# Patient Record
Sex: FEMALE | Race: WHITE | NOT HISPANIC OR LATINO | Employment: OTHER | URBAN - METROPOLITAN AREA
[De-identification: names, ages, dates, MRNs, and addresses within clinical notes are randomized per-mention and may not be internally consistent; named-entity substitution may affect disease eponyms.]

---

## 2017-01-04 ENCOUNTER — TREATMENT (OUTPATIENT)
Dept: PHYSICAL THERAPY | Facility: CLINIC | Age: 72
End: 2017-01-04

## 2017-01-04 DIAGNOSIS — M21.372 FOOT DROP, LEFT: Primary | ICD-10-CM

## 2017-01-04 DIAGNOSIS — R29.898 WEAKNESS OF LEFT LEG: ICD-10-CM

## 2017-01-04 PROCEDURE — 97116 GAIT TRAINING THERAPY: CPT | Performed by: PHYSICAL THERAPIST

## 2017-01-04 PROCEDURE — 97140 MANUAL THERAPY 1/> REGIONS: CPT | Performed by: PHYSICAL THERAPIST

## 2017-01-04 NOTE — PROGRESS NOTES
Daily Progress Note      Subjective   Pt reports compliance with HEP.        Objective   See the Objective Evaluation flowsheet for any objective testing that may have been performed today.    PROCEDURES AND MODALITIES:  Paraffin:    Moist Heat:    Ice:    E-Stim:    Ultrasound:    Ionto:   Traction:      Manual PT 55154 8 minutes, Therapy Exercise 78452 10 minutes and Gait Training 18347 12 minutes     Timed Code Treatment: 30 Minutes  Total Treatment Time: 50 Minutes    Assessment/Plan   Pt continues to have difficulty with active left hip flexion and ankle DF.  Pt fatigues easily with gait training.  Pt would benefit from additional skilled PT to continue to progress left LE strength to pt tolerance and improve gait.   Progress per Plan of Care       Noelle Reza, PT  Physical Therapist

## 2017-01-06 ENCOUNTER — TREATMENT (OUTPATIENT)
Dept: PHYSICAL THERAPY | Facility: CLINIC | Age: 72
End: 2017-01-06

## 2017-01-06 DIAGNOSIS — M21.372 FOOT DROP, LEFT: Primary | ICD-10-CM

## 2017-01-06 DIAGNOSIS — R29.898 WEAKNESS OF LEFT LEG: ICD-10-CM

## 2017-01-06 PROCEDURE — 97116 GAIT TRAINING THERAPY: CPT | Performed by: PHYSICAL THERAPIST

## 2017-01-06 PROCEDURE — 97110 THERAPEUTIC EXERCISES: CPT | Performed by: PHYSICAL THERAPIST

## 2017-01-06 NOTE — PROGRESS NOTES
Daily Progress Note      Subjective   Pt reports fatigue after last visit for the rest of the day.  Reports she is feeling back to normal today.       Objective   See the Objective Evaluation flowsheet for any objective testing that may have been performed today.    PROCEDURES AND MODALITIES:  Paraffin:    Moist Heat:    Ice:    E-Stim:    Ultrasound:    Ionto:   Traction:      Therapy Exercise 52056 15 minutes and Gait Training 96240 10 minutes     Timed Code Treatment: 25 Minutes  Total Treatment Time: 40 Minutes    Assessment/Plan   Pt required intermittent cueing for gait pattern.  Pt continues to fatigue quickly with hip flexion exercises and gait.  Pt would benefit from additional skilled PT to continue to progress LE strength and gait.   Progress strengthening /stabilization /functional activity         Noelle Reza, PT  Physical Therapist

## 2017-01-10 ENCOUNTER — TREATMENT (OUTPATIENT)
Dept: PHYSICAL THERAPY | Facility: CLINIC | Age: 72
End: 2017-01-10

## 2017-01-10 DIAGNOSIS — R29.898 WEAKNESS OF LEFT LEG: ICD-10-CM

## 2017-01-10 DIAGNOSIS — M21.372 FOOT DROP, LEFT: Primary | ICD-10-CM

## 2017-01-10 PROCEDURE — 97116 GAIT TRAINING THERAPY: CPT | Performed by: PHYSICAL THERAPIST

## 2017-01-11 NOTE — PROGRESS NOTES
Daily Progress Note      Subjective   Pt has no new complaints. Reports frustration that she cant get out of the house on her own anymore.   Pain Scale (0-10): 0      Objective   See the Objective Evaluation flowsheet for any objective testing that may have been performed today.    PROCEDURES AND MODALITIES:  Paraffin:    Moist Heat:    Ice:    E-Stim:    Ultrasound:    Ionto:   Traction:      Gait Training 40756 15 minutes     Timed Code Treatment: 15 Minutes  Total Treatment Time: 45 Minutes    Assessment/Plan   Pt performed well with all strengthening exercises today, does continue to demonstrate decreased left hip and knee flexion, ankle DF with gait.  Pt would benefit from additional skilled PT to continue to progress LE strength and improve gait.   Progress strengthening /stabilization /functional activity       Noelle Reza, PT  Physical Therapist

## 2017-01-17 ENCOUNTER — TREATMENT (OUTPATIENT)
Dept: PHYSICAL THERAPY | Facility: CLINIC | Age: 72
End: 2017-01-17

## 2017-01-17 DIAGNOSIS — R29.898 WEAKNESS OF LEFT LEG: ICD-10-CM

## 2017-01-17 DIAGNOSIS — M21.372 FOOT DROP, LEFT: Primary | ICD-10-CM

## 2017-01-17 PROCEDURE — 97116 GAIT TRAINING THERAPY: CPT | Performed by: PHYSICAL THERAPIST

## 2017-01-17 NOTE — PROGRESS NOTES
"Daily Progress Note      Subjective   Pt reports, \"I think I am going to make today my last visit.\"  Pain Scale (0-10): 0      Objective   See the Objective Evaluation flowsheet for any objective testing that may have been performed today.    PROCEDURES AND MODALITIES:  Paraffin:    Moist Heat:    Ice:    E-Stim:    Ultrasound:    Ionto:   Traction:      Gait Training 62075 15 minutes     Timed Code Treatment: 15 Minutes  Total Treatment Time: 40 Minutes    Assessment/Plan    Pt demonstrates independence with HEP, reports she can perform all exercises at home.  Pt does demonstrate improvement in left LE strengthening tolerance.  Pt continues to demonstrates decreased left hip and knee flexion during swing with gait, however, suspect this is due more to CNS involvement.  Pt met 3/4 STGs, 2/4 LTGs.   Other-d/c to [facility]       Noelle Reza, PT  Physical Therapist      "

## 2017-01-17 NOTE — PROGRESS NOTES
Discharge Report    Patient Name: Cesilia Goel       Patient MRN: [id number]  : 1945  Physician:Suraj Waters MD  Date: 2017    Encounter Diagnoses   Name Primary?   • Foot drop, left Yes   • Weakness of left leg      Treatment has included therapeutic exercise, manual therapy and gait training      Assessment: Pt demonstrates independence with HEP, reports she can perform all exercises at home.  Pt does demonstrate improvement in left LE strengthening tolerance.  Pt continues to demonstrates decreased left hip and knee flexion during swing with gait, however, suspect this is due more to CNS involvement.  Pt met 3/4 STGs, 2/4 LTGs.    Progress towards goals: Partially Met    Discharge Reason: Patient achieved goals, Plateau in patient's progress and Anticipate patient will achieve long-term goals independently      Plan of Care: Continue with current home exercise program as instructed    Prognosis: good    Understanding at Discharge: good

## 2017-02-08 RX ORDER — LOSARTAN POTASSIUM 100 MG/1
100 TABLET ORAL DAILY
Qty: 90 TABLET | Refills: 1 | Status: SHIPPED | OUTPATIENT
Start: 2017-02-08 | End: 2017-08-06 | Stop reason: SDUPTHER

## 2017-02-08 RX ORDER — ESCITALOPRAM OXALATE 10 MG/1
10 TABLET ORAL DAILY
Qty: 90 TABLET | Refills: 1 | Status: SHIPPED | OUTPATIENT
Start: 2017-02-08 | End: 2017-08-06 | Stop reason: SDUPTHER

## 2017-02-08 RX ORDER — ATORVASTATIN CALCIUM 40 MG/1
TABLET, FILM COATED ORAL
Qty: 90 TABLET | Refills: 0 | Status: SHIPPED | OUTPATIENT
Start: 2017-02-08 | End: 2017-05-11 | Stop reason: SDUPTHER

## 2017-02-20 PROBLEM — E11.9 DIABETES: Status: ACTIVE | Noted: 2017-02-20

## 2017-03-07 DIAGNOSIS — E11.9 TYPE 2 DIABETES MELLITUS WITHOUT COMPLICATION, WITHOUT LONG-TERM CURRENT USE OF INSULIN (HCC): ICD-10-CM

## 2017-03-07 DIAGNOSIS — Z00.00 ANNUAL PHYSICAL EXAM: Primary | ICD-10-CM

## 2017-03-11 LAB
ALBUMIN SERPL-MCNC: 4.4 G/DL (ref 3.5–5.2)
ALBUMIN/GLOB SERPL: 1.7 G/DL
ALP SERPL-CCNC: 94 U/L (ref 39–117)
ALT SERPL-CCNC: 24 U/L (ref 1–33)
APPEARANCE UR: ABNORMAL
AST SERPL-CCNC: 19 U/L (ref 1–32)
BACTERIA #/AREA URNS HPF: ABNORMAL /HPF
BACTERIA UR CULT: NORMAL
BACTERIA UR CULT: NORMAL
BASOPHILS # BLD AUTO: 0.03 10*3/MM3 (ref 0–0.2)
BASOPHILS NFR BLD AUTO: 0.3 % (ref 0–1.5)
BILIRUB SERPL-MCNC: 0.3 MG/DL (ref 0.1–1.2)
BILIRUB UR QL STRIP: NEGATIVE
BUN SERPL-MCNC: 15 MG/DL (ref 8–23)
BUN/CREAT SERPL: 25.4 (ref 7–25)
CALCIUM SERPL-MCNC: 10.3 MG/DL (ref 8.6–10.5)
CHLORIDE SERPL-SCNC: 99 MMOL/L (ref 98–107)
CHOLEST SERPL-MCNC: 180 MG/DL (ref 0–200)
CO2 SERPL-SCNC: 28.1 MMOL/L (ref 22–29)
COLOR UR: YELLOW
CREAT SERPL-MCNC: 0.59 MG/DL (ref 0.57–1)
EOSINOPHIL # BLD AUTO: 0.38 10*3/MM3 (ref 0–0.7)
EOSINOPHIL NFR BLD AUTO: 3.4 % (ref 0.3–6.2)
EPI CELLS #/AREA URNS HPF: >10 /HPF
ERYTHROCYTE [DISTWIDTH] IN BLOOD BY AUTOMATED COUNT: 14.6 % (ref 11.7–13)
GLOBULIN SER CALC-MCNC: 2.6 GM/DL
GLUCOSE SERPL-MCNC: 147 MG/DL (ref 65–99)
GLUCOSE UR QL: NEGATIVE
HBA1C MFR BLD: 7.2 % (ref 4.8–5.6)
HCT VFR BLD AUTO: 43.2 % (ref 35.6–45.5)
HDLC SERPL-MCNC: 46 MG/DL (ref 40–60)
HGB BLD-MCNC: 13.8 G/DL (ref 11.9–15.5)
HGB UR QL STRIP: NEGATIVE
IMM GRANULOCYTES # BLD: 0.03 10*3/MM3 (ref 0–0.03)
IMM GRANULOCYTES NFR BLD: 0.3 % (ref 0–0.5)
KETONES UR QL STRIP: NEGATIVE
LDLC SERPL CALC-MCNC: 64 MG/DL (ref 0–100)
LEUKOCYTE ESTERASE UR QL STRIP: ABNORMAL
LYMPHOCYTES # BLD AUTO: 3.77 10*3/MM3 (ref 0.9–4.8)
LYMPHOCYTES NFR BLD AUTO: 33.8 % (ref 19.6–45.3)
MCH RBC QN AUTO: 29.4 PG (ref 26.9–32)
MCHC RBC AUTO-ENTMCNC: 31.9 G/DL (ref 32.4–36.3)
MCV RBC AUTO: 91.9 FL (ref 80.5–98.2)
MICRO URNS: ABNORMAL
MICROALBUMIN UR-MCNC: 5.6 UG/ML
MONOCYTES # BLD AUTO: 0.79 10*3/MM3 (ref 0.2–1.2)
MONOCYTES NFR BLD AUTO: 7.1 % (ref 5–12)
MUCOUS THREADS URNS QL MICRO: PRESENT /HPF
NEUTROPHILS # BLD AUTO: 6.14 10*3/MM3 (ref 1.9–8.1)
NEUTROPHILS NFR BLD AUTO: 55.1 % (ref 42.7–76)
NITRITE UR QL STRIP: NEGATIVE
PH UR STRIP: 6 [PH] (ref 5–7.5)
PLATELET # BLD AUTO: 257 10*3/MM3 (ref 140–500)
POTASSIUM SERPL-SCNC: 4.5 MMOL/L (ref 3.5–5.2)
PROT SERPL-MCNC: 7 G/DL (ref 6–8.5)
PROT UR QL STRIP: ABNORMAL
RBC # BLD AUTO: 4.7 10*6/MM3 (ref 3.9–5.2)
RBC #/AREA URNS HPF: ABNORMAL /HPF
SODIUM SERPL-SCNC: 141 MMOL/L (ref 136–145)
SP GR UR: 1.02 (ref 1–1.03)
TRIGL SERPL-MCNC: 349 MG/DL (ref 0–150)
TSH SERPL DL<=0.005 MIU/L-ACNC: 2.26 UIU/ML (ref 0.45–4.5)
URINALYSIS REFLEX: ABNORMAL
UROBILINOGEN UR STRIP-MCNC: 0.2 MG/DL (ref 0.2–1)
VLDLC SERPL CALC-MCNC: 69.8 MG/DL (ref 5–40)
WBC # BLD AUTO: 11.14 10*3/MM3 (ref 4.5–10.7)
WBC #/AREA URNS HPF: ABNORMAL /HPF

## 2017-03-15 ENCOUNTER — TELEPHONE (OUTPATIENT)
Dept: SPORTS MEDICINE | Facility: CLINIC | Age: 72
End: 2017-03-15

## 2017-03-15 NOTE — TELEPHONE ENCOUNTER
----- Message from Suraj Waters MD sent at 3/13/2017 11:36 PM EDT -----  Labs are all stable. Diabetes is better than before but still not quite ideal. Let's continue the same meds and follow up in 6 months.

## 2017-05-11 RX ORDER — ATORVASTATIN CALCIUM 40 MG/1
TABLET, FILM COATED ORAL
Qty: 90 TABLET | Refills: 0 | Status: SHIPPED | OUTPATIENT
Start: 2017-05-11 | End: 2017-08-06 | Stop reason: SDUPTHER

## 2017-06-26 ENCOUNTER — APPOINTMENT (OUTPATIENT)
Dept: WOMENS IMAGING | Facility: HOSPITAL | Age: 72
End: 2017-06-26

## 2017-06-26 ENCOUNTER — PROCEDURE VISIT (OUTPATIENT)
Dept: OBSTETRICS AND GYNECOLOGY | Facility: CLINIC | Age: 72
End: 2017-06-26

## 2017-06-26 DIAGNOSIS — Z12.31 VISIT FOR SCREENING MAMMOGRAM: Primary | ICD-10-CM

## 2017-06-26 PROCEDURE — G0202 SCR MAMMO BI INCL CAD: HCPCS | Performed by: RADIOLOGY

## 2017-06-26 PROCEDURE — G0202 SCR MAMMO BI INCL CAD: HCPCS | Performed by: OBSTETRICS & GYNECOLOGY

## 2017-07-20 ENCOUNTER — OFFICE VISIT (OUTPATIENT)
Dept: SPORTS MEDICINE | Facility: CLINIC | Age: 72
End: 2017-07-20

## 2017-07-20 VITALS
WEIGHT: 165 LBS | DIASTOLIC BLOOD PRESSURE: 72 MMHG | SYSTOLIC BLOOD PRESSURE: 122 MMHG | HEIGHT: 63 IN | OXYGEN SATURATION: 96 % | BODY MASS INDEX: 29.23 KG/M2 | HEART RATE: 72 BPM

## 2017-07-20 DIAGNOSIS — Z11.59 NEED FOR HEPATITIS C SCREENING TEST: ICD-10-CM

## 2017-07-20 DIAGNOSIS — E78.2 MIXED HYPERLIPIDEMIA: ICD-10-CM

## 2017-07-20 DIAGNOSIS — Z00.00 MEDICARE ANNUAL WELLNESS VISIT, SUBSEQUENT: ICD-10-CM

## 2017-07-20 DIAGNOSIS — I10 ESSENTIAL HYPERTENSION: ICD-10-CM

## 2017-07-20 DIAGNOSIS — J40 BRONCHITIS: Primary | ICD-10-CM

## 2017-07-20 DIAGNOSIS — E11.9 TYPE 2 DIABETES MELLITUS WITHOUT COMPLICATION, WITHOUT LONG-TERM CURRENT USE OF INSULIN (HCC): ICD-10-CM

## 2017-07-20 PROBLEM — F32.0 MILD SINGLE CURRENT EPISODE OF MAJOR DEPRESSIVE DISORDER: Status: ACTIVE | Noted: 2017-07-20

## 2017-07-20 PROBLEM — R42 DIZZINESS: Status: RESOLVED | Noted: 2017-07-20 | Resolved: 2017-07-20

## 2017-07-20 PROBLEM — IMO0001 BLUES: Status: ACTIVE | Noted: 2017-07-20

## 2017-07-20 PROBLEM — R42 DIZZINESS: Status: ACTIVE | Noted: 2017-07-20

## 2017-07-20 PROBLEM — R32 ABSENCE OF BLADDER CONTINENCE: Status: ACTIVE | Noted: 2017-07-20

## 2017-07-20 PROBLEM — J30.9 ALLERGIC RHINITIS: Status: ACTIVE | Noted: 2017-07-20

## 2017-07-20 PROBLEM — L30.9 DERMATITIS, ECZEMATOID: Status: ACTIVE | Noted: 2017-07-20

## 2017-07-20 PROBLEM — E78.5 HLD (HYPERLIPIDEMIA): Status: ACTIVE | Noted: 2017-07-20

## 2017-07-20 PROCEDURE — G0439 PPPS, SUBSEQ VISIT: HCPCS | Performed by: FAMILY MEDICINE

## 2017-07-20 PROCEDURE — 99214 OFFICE O/P EST MOD 30 MIN: CPT | Performed by: FAMILY MEDICINE

## 2017-07-20 RX ORDER — MULTIVITAMIN
1 TABLET ORAL
COMMUNITY

## 2017-07-20 RX ORDER — AZITHROMYCIN 250 MG/1
TABLET, FILM COATED ORAL
Qty: 6 TABLET | Refills: 0 | Status: SHIPPED | OUTPATIENT
Start: 2017-07-20 | End: 2017-09-08

## 2017-07-20 RX ORDER — VALSARTAN 160 MG/1
160 TABLET ORAL
COMMUNITY
End: 2019-02-04

## 2017-07-20 RX ORDER — SOLIFENACIN SUCCINATE 5 MG/1
TABLET, FILM COATED ORAL
Refills: 2 | COMMUNITY
Start: 2017-04-13 | End: 2017-07-20 | Stop reason: SDUPTHER

## 2017-07-20 RX ORDER — SOLIFENACIN SUCCINATE 5 MG/1
5 TABLET, FILM COATED ORAL DAILY
Qty: 90 TABLET | Refills: 3 | Status: SHIPPED | OUTPATIENT
Start: 2017-07-20 | End: 2019-02-04

## 2017-07-20 NOTE — PROGRESS NOTES
"Cesilia is a 71 y.o. year old female    In addition to AWV:  Chief Complaint   Patient presents with   • Sinusitis     Pt would like to discuss       History of Present Illness   HPI   Recent sinus congestion  Improved today but was severe yesterday - pressure in face/ears  Cough, no SOA, feels some tightness in evenings    HTN, HLD, DM - stable with current meds, no symptoms    MS - Having infusion in August for her MS ([facility])    Incontinence - vesicare working well    I have reviewed the patient's medical history in detail and updated the computerized patient record.    Review of Systems   Constitutional: Negative for fever.   HENT: Positive for sinus pressure.    Respiratory: Negative for shortness of breath.    Cardiovascular: Negative for chest pain.       /72  Pulse 72  Ht 63\" (160 cm)  Wt 165 lb (74.8 kg)  SpO2 96%  BMI 29.23 kg/m2     Physical Exam   Constitutional: She is oriented to person, place, and time. She appears well-developed and well-nourished.   Neck: Normal range of motion.   Cardiovascular: Normal rate, regular rhythm and normal heart sounds.    Pulmonary/Chest: Effort normal and breath sounds normal.   Neurological: She is alert and oriented to person, place, and time.   Skin: Skin is warm and dry.   Psychiatric: She has a normal mood and affect.   Vitals reviewed.       Diagnoses and all orders for this visit:    Bronchitis    Type 2 diabetes mellitus without complication, without long-term current use of insulin  -     Hemoglobin A1c    Essential hypertension    Mixed hyperlipidemia    Need for hepatitis C screening test  -     Hepatitis C Antibody    Other orders  -     Multiple Vitamin (MULTIVITAMIN) tablet; Take 1 tablet by mouth.  -     Discontinue: VESICARE 5 MG tablet; TK 1 T PO QD  -     valsartan (DIOVAN) 160 MG tablet; Take 160 mg by mouth.  -     Calcium Carbonate-Vitamin D 500-125 MG-UNIT tablet; Take  by mouth.  -     VESICARE 5 MG tablet; Take 1 tablet by mouth " Daily.  -     azithromycin (ZITHROMAX Z-DEBRA) 250 MG tablet; Take 2 tablets the first day, then 1 tablet daily for 4 days.     Appears to have lingering bronchitis, use zpak plus dulera bid x 2 weeks (sample given)

## 2017-07-20 NOTE — PROGRESS NOTES
QUICK REFERENCE INFORMATION:  The ABCs of the Annual Wellness Visit    Subsequent Medicare Wellness Visit    HEALTH RISK ASSESSMENT    1945    Recent Hospitalizations:  No hospitalization(s) within the last year..        Current Medical Providers:  Patient Care Team:  Suraj Waters MD as PCP - General (Sports Medicine)  Montrell Hoang DPM as PCP - Claims Attributed        Smoking Status:  History   Smoking Status   • Never Smoker   Smokeless Tobacco   • Not on file       Alcohol Consumption:  History   Alcohol Use No       Depression Screen:   PHQ-9 Depression Screening 7/20/2017   Little interest or pleasure in doing things 0   Feeling down, depressed, or hopeless 0   Trouble falling or staying asleep, or sleeping too much 0   Feeling tired or having little energy 0   Poor appetite or overeating 0   Feeling bad about yourself - or that you are a failure or have let yourself or your family down 0   Trouble concentrating on things, such as reading the newspaper or watching television 0   Moving or speaking so slowly that other people could have noticed. Or the opposite - being so fidgety or restless that you have been moving around a lot more than usual 0   Thoughts that you would be better off dead, or of hurting yourself in some way 0   PHQ-9 Total Score 0   If you checked off any problems, how difficult have these problems made it for you to do your work, take care of things at home, or get along with other people? Not difficult at all       Health Habits and Functional and Cognitive Screening:  No flowsheet data found.           Does the patient have evidence of cognitive impairment? No    Aspirin use counseling: Start ASA 81 mg daily       Recent Lab Results:  CMP:  Lab Results   Component Value Date     (H) 03/08/2017    BUN 15 03/08/2017    CREATININE 0.59 03/08/2017    EGFRIFNONA 101 03/08/2017    EGFRIFAFRI 122 03/08/2017    BCR 25.4 (H) 03/08/2017     03/08/2017    K 4.5  03/08/2017    CO2 28.1 03/08/2017    CALCIUM 10.3 03/08/2017    PROTENTOTREF 7.0 03/08/2017    ALBUMIN 4.40 03/08/2017    LABGLOBREF 2.6 03/08/2017    LABIL2 1.7 03/08/2017    BILITOT 0.3 03/08/2017    ALKPHOS 94 03/08/2017    AST 19 03/08/2017    ALT 24 03/08/2017     Lipid Panel:  Lab Results   Component Value Date    TRIG 349 (H) 03/08/2017    HDL 46 03/08/2017    VLDL 69.8 (H) 03/08/2017     HbA1c:  Lab Results   Component Value Date    HGBA1C 7.30 (H) 07/20/2017       Visual Acuity:  No exam data present    Age-appropriate Screening Schedule:  Refer to the list below for future screening recommendations based on patient's age, sex and/or medical conditions. Orders for these recommended tests are listed in the plan section. The patient has been provided with a written plan.    Health Maintenance   Topic Date Due   • DIABETIC FOOT EXAM  03/29/2016   • INFLUENZA VACCINE  08/01/2017   • HEMOGLOBIN A1C  01/20/2018   • LIPID PANEL  03/08/2018   • URINE MICROALBUMIN  03/08/2018   • DIABETIC EYE EXAM  06/08/2018   • PNEUMOCOCCAL VACCINES (65+ LOW/MEDIUM RISK) (2 of 2 - PPSV23) 07/20/2018   • MAMMOGRAM  06/26/2019   • COLONOSCOPY  07/20/2027   • TDAP/TD VACCINES (2 - Td) 07/20/2027   • ZOSTER VACCINE  Addressed        Subjective   History of Present Illness    Cesilia Goel is a 71 y.o. female who presents for an Subsequent Wellness Visit.    The following portions of the patient's history were reviewed and updated as appropriate: allergies, current medications, past family history, past medical history, past social history, past surgical history and problem list.    Outpatient Medications Prior to Visit   Medication Sig Dispense Refill   • atorvastatin (LIPITOR) 40 MG tablet TAKE 1 TABLET BY MOUTH DAILY 90 tablet 0   • escitalopram (LEXAPRO) 10 MG tablet Take 1 tablet by mouth Daily. 90 tablet 1   • losartan (COZAAR) 100 MG tablet Take 1 tablet by mouth Daily. 90 tablet 1   • metFORMIN (GLUCOPHAGE) 1000 MG tablet  TAKE 1 TABLET BY MOUTH TWICE DAILY AFTER MEAL 180 tablet 0     No facility-administered medications prior to visit.        Patient Active Problem List   Diagnosis   • Type 2 diabetes mellitus without complication, without long-term current use of insulin   • Allergic rhinitis   • Mild single current episode of major depressive disorder   • Dermatitis, eczematoid   • HLD (hyperlipidemia)   • Hypertension   • Multiple sclerosis, primary progressive   • Absence of bladder continence       Advance Care Planning:  has an advance directive - a copy HAS NOT been provided. Have asked the patient to send this to us to add to record.    Identification of Risk Factors:  Risk factors include: weight , cardiovascular risk, inactivity and incontinence.    Review of Systems   Constitutional: Negative for fatigue.   Respiratory: Negative for shortness of breath.    Cardiovascular: Negative for chest pain.       Compared to one year ago, the patient feels her physical health is the same.  Compared to one year ago, the patient feels her mental health is the same.    Objective     Physical Exam   Constitutional: She is oriented to person, place, and time. She appears well-developed and well-nourished.   HENT:   Mouth/Throat: Oropharynx is clear and moist.   Eyes: Conjunctivae are normal. Pupils are equal, round, and reactive to light.   Neck: Normal range of motion. Neck supple. No thyromegaly present.   Cardiovascular: Normal rate, regular rhythm and normal heart sounds.    Pulmonary/Chest: Effort normal and breath sounds normal.   Abdominal: Soft.   Musculoskeletal: She exhibits no deformity.   Gait is stable; slow and cautious   Neurological: She is alert and oriented to person, place, and time.   Skin: Skin is warm and dry.   Psychiatric: She has a normal mood and affect. Her behavior is normal.   Memory seems to have slower recall   Vitals reviewed.      Vitals:    07/20/17 1341   BP: 122/72   Pulse: 72   SpO2: 96%   Weight: 165  "lb (74.8 kg)   Height: 63\" (160 cm)   PainSc: 0-No pain       Body mass index is 29.23 kg/(m^2).  Discussed the patient's BMI with her. The BMI is above average; BMI management plan is completed.    Assessment/Plan   Patient Self-Management and Personalized Health Advice  The patient has been provided with information about: diet, exercise, weight management, prevention of cardiac or vascular disease, fall prevention, designing advance directives and mental health concerns and preventive services including:   · Advance directive, Counseling for cardiovascular disease risk reduction, Nutrition counseling provided.    Visit Diagnoses:    ICD-10-CM ICD-9-CM   1. Bronchitis J40 490   2. Type 2 diabetes mellitus without complication, without long-term current use of insulin E11.9 250.00   3. Essential hypertension I10 401.9   4. Mixed hyperlipidemia E78.2 272.2   5. Need for hepatitis C screening test Z11.59 V73.89       Orders Placed This Encounter   Procedures   • Hepatitis C Antibody     Order Specific Question:   [facility] Has the patient fasted?     Answer:   No   • Hemoglobin A1c     Order Specific Question:   [facility] Has the patient fasted?     Answer:   No       Outpatient Encounter Prescriptions as of 7/20/2017   Medication Sig Dispense Refill   • atorvastatin (LIPITOR) 40 MG tablet TAKE 1 TABLET BY MOUTH DAILY 90 tablet 0   • azithromycin (ZITHROMAX Z-DEBRA) 250 MG tablet Take 2 tablets the first day, then 1 tablet daily for 4 days. 6 tablet 0   • Calcium Carbonate-Vitamin D 500-125 MG-UNIT tablet Take  by mouth.     • escitalopram (LEXAPRO) 10 MG tablet Take 1 tablet by mouth Daily. 90 tablet 1   • losartan (COZAAR) 100 MG tablet Take 1 tablet by mouth Daily. 90 tablet 1   • metFORMIN (GLUCOPHAGE) 1000 MG tablet TAKE 1 TABLET BY MOUTH TWICE DAILY AFTER MEAL 180 tablet 0   • Multiple Vitamin (MULTIVITAMIN) tablet Take 1 tablet by mouth.     • valsartan (DIOVAN) 160 MG tablet Take 160 mg by mouth.     • VESICARE 5 " MG tablet Take 1 tablet by mouth Daily. 90 tablet 3   • [DISCONTINUED] VESICARE 5 MG tablet TK 1 T PO QD  2     No facility-administered encounter medications on file as of 7/20/2017.        Reviewed use of high risk medication in the elderly: yes  Reviewed for potential of harmful drug interactions in the elderly: yes    Follow Up:  Return in about 6 months (around 1/20/2018).     An After Visit Summary and PPPS with all of these plans were given to the patient.

## 2017-07-21 ENCOUNTER — TELEPHONE (OUTPATIENT)
Dept: SPORTS MEDICINE | Facility: CLINIC | Age: 72
End: 2017-07-21

## 2017-07-21 LAB
HBA1C MFR BLD: 7.3 % (ref 4.8–5.6)
HCV AB S/CO SERPL IA: <0.1 S/CO RATIO (ref 0–0.9)

## 2017-07-21 NOTE — TELEPHONE ENCOUNTER
----- Message from Suraj Waters MD sent at 7/21/2017  1:30 PM EDT -----  A1c is slightly higher than last check, up to 7.3 from 7.2; this is not severe but does warrant continued caution for preventing complications. Request continue to be careful with sugar/starches in diet. We can continue to be careful in this range for now and recheck in 6 months.

## 2017-08-07 RX ORDER — ATORVASTATIN CALCIUM 40 MG/1
TABLET, FILM COATED ORAL
Qty: 90 TABLET | Refills: 0 | Status: SHIPPED | OUTPATIENT
Start: 2017-08-07 | End: 2017-11-05 | Stop reason: SDUPTHER

## 2017-08-07 RX ORDER — ESCITALOPRAM OXALATE 10 MG/1
TABLET ORAL
Qty: 90 TABLET | Refills: 0 | Status: SHIPPED | OUTPATIENT
Start: 2017-08-07 | End: 2017-11-05 | Stop reason: SDUPTHER

## 2017-08-07 RX ORDER — LOSARTAN POTASSIUM 100 MG/1
TABLET ORAL
Qty: 90 TABLET | Refills: 0 | Status: SHIPPED | OUTPATIENT
Start: 2017-08-07 | End: 2017-11-05 | Stop reason: SDUPTHER

## 2017-09-08 ENCOUNTER — OFFICE VISIT (OUTPATIENT)
Dept: SPORTS MEDICINE | Facility: CLINIC | Age: 72
End: 2017-09-08

## 2017-09-08 VITALS
DIASTOLIC BLOOD PRESSURE: 74 MMHG | HEART RATE: 82 BPM | BODY MASS INDEX: 28.88 KG/M2 | WEIGHT: 163 LBS | HEIGHT: 63 IN | OXYGEN SATURATION: 98 % | SYSTOLIC BLOOD PRESSURE: 118 MMHG | TEMPERATURE: 98.5 F

## 2017-09-08 DIAGNOSIS — R05.9 COUGH: Primary | ICD-10-CM

## 2017-09-08 DIAGNOSIS — J40 BRONCHITIS: ICD-10-CM

## 2017-09-08 PROCEDURE — 71020 XR CHEST PA AND LATERAL: CPT | Performed by: FAMILY MEDICINE

## 2017-09-08 PROCEDURE — 99214 OFFICE O/P EST MOD 30 MIN: CPT | Performed by: FAMILY MEDICINE

## 2017-09-08 RX ORDER — CEFDINIR 300 MG/1
300 CAPSULE ORAL 2 TIMES DAILY
Qty: 20 CAPSULE | Refills: 0 | Status: SHIPPED | OUTPATIENT
Start: 2017-09-08 | End: 2017-10-17

## 2017-09-08 NOTE — PROGRESS NOTES
"Cesilia is a 71 y.o. year old female    Chief Complaint   Patient presents with   • Cough     x2 days   • Fatigue   • Sore Throat       History of Present Illness   HPI   Cough for a few days, mod severe, assoc with fatigue and sore throat. Productive of mucus    I have reviewed the patient's medical, family, and social history in detail and updated the computerized patient record.    Review of Systems   Constitutional: Negative for fever.   Respiratory: Positive for cough. Negative for shortness of breath.    Gastrointestinal: Negative.        /74  Pulse 82  Temp 98.5 °F (36.9 °C)  Ht 63\" (160 cm)  Wt 163 lb (73.9 kg)  SpO2 98%  BMI 28.87 kg/m2     Physical Exam   Constitutional: She appears well-developed.   HENT:   Posterior pharynx erythema   Eyes: Pupils are equal, round, and reactive to light.   Pulmonary/Chest: Effort normal.   There are diffuse rhonchi in the mid and lower lung fields bilaterally.  Faint wheezes.  No rales.   Psychiatric: She has a normal mood and affect.   Vitals reviewed.    Chest X-Ray  Indication: Cough  Views: PA and Lateral    Findings:  General pulmonary congestion without focal infiltrate  No pleural effusion.  Heart size and shape are normal.  Mediastinum is normal.  No visible rib fractures.   Overall, normal chest.    There were no previous studies available for comparison.       Diagnoses and all orders for this visit:    Cough  -     XR Chest PA & Lateral    Bronchitis  -     cefdinir (OMNICEF) 300 MG capsule; Take 1 capsule by mouth 2 (Two) Times a Day.    If not improving consider pulmonary function tests or short-term empiric inhaler use  "

## 2017-09-14 RX ORDER — METHYLPREDNISOLONE 4 MG/1
TABLET ORAL
Qty: 21 TABLET | Refills: 0 | Status: SHIPPED | OUTPATIENT
Start: 2017-09-14 | End: 2017-10-17

## 2017-10-13 ENCOUNTER — TELEPHONE (OUTPATIENT)
Dept: SPORTS MEDICINE | Facility: CLINIC | Age: 72
End: 2017-10-13

## 2017-10-13 NOTE — TELEPHONE ENCOUNTER
called and says wife is still having symptoms of the bronchitis and was requesting an appt. Can we schedule appt for her to be seen?

## 2017-10-17 ENCOUNTER — OFFICE VISIT (OUTPATIENT)
Dept: SPORTS MEDICINE | Facility: CLINIC | Age: 72
End: 2017-10-17

## 2017-10-17 VITALS
SYSTOLIC BLOOD PRESSURE: 120 MMHG | OXYGEN SATURATION: 96 % | BODY MASS INDEX: 28.17 KG/M2 | HEIGHT: 63 IN | DIASTOLIC BLOOD PRESSURE: 76 MMHG | HEART RATE: 84 BPM | WEIGHT: 159 LBS

## 2017-10-17 DIAGNOSIS — R05.9 COUGH: Primary | ICD-10-CM

## 2017-10-17 PROCEDURE — 99213 OFFICE O/P EST LOW 20 MIN: CPT | Performed by: FAMILY MEDICINE

## 2017-10-17 NOTE — PROGRESS NOTES
"Cesilia is a 72 y.o. year old female    Chief Complaint   Patient presents with   • Cough     Pt is here to f/u       History of Present Illness   HPI   Here for ongoing cough, which has been present on and off since July. Notes it did get worse after starting her new MS drug infusion, poss SE?   notes she is also clearing her throat.  Cough is productive of a small amount of mucus, relatively constant throughout the day, no associated fever or chills or shortness of breath    I have reviewed the patient's medical, family, and social history in detail and updated the computerized patient record.    Review of Systems   Constitutional: Negative.    Respiratory: Positive for cough. Negative for shortness of breath.    Gastrointestinal: Negative.        /76  Pulse 84  Ht 63\" (160 cm)  Wt 159 lb (72.1 kg)  SpO2 96%  BMI 28.17 kg/m2     Physical Exam   Constitutional: She appears well-developed.   HENT:   Mild posterior pharynx erythema   Eyes: Pupils are equal, round, and reactive to light.   Neck: Normal range of motion.   Cardiovascular: Normal heart sounds.    Pulmonary/Chest: Effort normal. No respiratory distress. She has no wheezes.   Few scattered rhonchi, good air movement   Psychiatric: She has a normal mood and affect.   Vitals reviewed.       Diagnoses and all orders for this visit:    Cough    I think this may be allergy in nature.  She is going to add antihistamine at bedtime and fluticasone 2 sprays per nostril daily. They will update me on her progress in 2 weeks; if not improving consider allergy consultation or spirometry    I spent greater than 50% of this 15 minute visit discussing the diagnosis, prognosis, treatment plan, etc. Patient's questions were answered in detail with appropriate counseling and anticipatory guidance.     EMR Dragon/Transcription disclaimer:    Much of this encounter note is an electronic transcription/translation of spoken language to printed text.  The " electronic translation of spoken language may permit erroneous, or at times, nonsensical words or phrases to be inadvertently transcribed.  Although I have reviewed the note for such errors some may still exist.

## 2017-11-06 RX ORDER — LOSARTAN POTASSIUM 100 MG/1
TABLET ORAL
Qty: 90 TABLET | Refills: 0 | Status: SHIPPED | OUTPATIENT
Start: 2017-11-06 | End: 2018-02-02 | Stop reason: SDUPTHER

## 2017-11-06 RX ORDER — ATORVASTATIN CALCIUM 40 MG/1
TABLET, FILM COATED ORAL
Qty: 90 TABLET | Refills: 0 | Status: SHIPPED | OUTPATIENT
Start: 2017-11-06 | End: 2018-02-02 | Stop reason: SDUPTHER

## 2017-11-06 RX ORDER — ESCITALOPRAM OXALATE 10 MG/1
TABLET ORAL
Qty: 90 TABLET | Refills: 0 | Status: SHIPPED | OUTPATIENT
Start: 2017-11-06 | End: 2018-02-02 | Stop reason: SDUPTHER

## 2017-11-14 ENCOUNTER — OFFICE VISIT (OUTPATIENT)
Dept: SLEEP MEDICINE | Facility: HOSPITAL | Age: 72
End: 2017-11-14
Attending: PSYCHIATRY & NEUROLOGY

## 2017-11-14 VITALS
WEIGHT: 166 LBS | BODY MASS INDEX: 28.34 KG/M2 | HEIGHT: 64 IN | HEART RATE: 84 BPM | DIASTOLIC BLOOD PRESSURE: 63 MMHG | SYSTOLIC BLOOD PRESSURE: 137 MMHG

## 2017-11-14 DIAGNOSIS — G47.33 OSA (OBSTRUCTIVE SLEEP APNEA): Primary | ICD-10-CM

## 2017-11-14 DIAGNOSIS — G47.10 HYPERSOMNIA: ICD-10-CM

## 2017-11-14 PROCEDURE — G0463 HOSPITAL OUTPT CLINIC VISIT: HCPCS

## 2017-11-14 NOTE — PROGRESS NOTES
"Cesilia Goel  : 1945  [id number]     DATE OF VISIT: 2017     HISTORY OF PRESENT ILLNESS: The patient returns for followup visit. 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 She received IV infusion with OCUVEVUS in 2017 and she is under the care of Dr. Tez Mckay at [facility].    She has severe obstructive sleep apnea syndrome and her polysomnography in the past has revealed apnea-hypopnea index of 83.6 per sleep hour, minimum SpO2 of 79% and is on CPAP at 15 cm H20. She had hypersomnia with [place] Sleepiness Scale score of 9.     Compliance data indicates excellent compliance with CPAP at 15 cm H20 with 96.7% usage with an average usage of 8 hours and 47 minutes and AHI down to 1.8 and [place] Sleepiness Scale score is down to 6.     ROS: Significant for postnasal drip and cough.  PHYSICAL EXAMINATION:  Vitals:    17 1137   BP: 137/63   BP Location: Left arm   Patient Position: Sitting   Pulse: 84   Weight: 166 lb (75.3 kg)   Height: 64\" (162.6 cm)     HEENT: Normal.   NECK: Supple. No bruits.   CARDIAC: Normal.   LUNGS: Clear to auscultation.   EXTREMITIES: No edema. She has foot drop and spastic paraparesis.     IMPRESSION: Patient with severe obstructive sleep apnea syndrome, AHI 83.6, minimum SpO2 of 79% and on CPAP at 15 cm H20, and excellent compliance. She is compliant and benefiting from it.     RECOMMENDATIONS: Continue present CPAP. Follow up in 1 year.     Mathew Hewitt M.D.  2017     "

## 2018-01-02 ENCOUNTER — OFFICE VISIT (OUTPATIENT)
Dept: SPORTS MEDICINE | Facility: CLINIC | Age: 73
End: 2018-01-02

## 2018-01-02 VITALS
WEIGHT: 166 LBS | SYSTOLIC BLOOD PRESSURE: 124 MMHG | DIASTOLIC BLOOD PRESSURE: 74 MMHG | OXYGEN SATURATION: 98 % | HEART RATE: 82 BPM | BODY MASS INDEX: 28.34 KG/M2 | HEIGHT: 64 IN

## 2018-01-02 DIAGNOSIS — S01.01XA LACERATION OF SCALP, INITIAL ENCOUNTER: Primary | ICD-10-CM

## 2018-01-02 DIAGNOSIS — R32 URINARY INCONTINENCE, UNSPECIFIED TYPE: ICD-10-CM

## 2018-01-02 PROCEDURE — 99212 OFFICE O/P EST SF 10 MIN: CPT | Performed by: FAMILY MEDICINE

## 2018-01-02 NOTE — PROGRESS NOTES
"Cesilia is a 72 y.o. year old female    Chief Complaint   Patient presents with   • Head Injury     Pt is here for ER f/u        History of Present Illness   HPI   Here today to follow-up on laceration. On 12/27 she bent over and hit her head on a cabinet.  Had bleeding and was stapled in the emergency room.  Otherwise no complications with that.  Had some mild headache for 24 hours or less that resolved.    I have reviewed the patient's medical, family, and social history in detail and updated the computerized patient record.    Review of Systems   Constitutional: Negative.        /74  Pulse 82  Ht 162.6 cm (64\")  Wt 75.3 kg (166 lb)  SpO2 98%  BMI 28.49 kg/m2     Physical Exam  Well-healed laceration anterior aspect of the scalp.  Single staple was removed without difficulty.     Diagnoses and all orders for this visit:    Laceration of scalp, initial encounter    Urinary incontinence, unspecified type  -     Ambulatory Referral to Gynecologic Urology             EMR Dragon/Transcription disclaimer:    Much of this encounter note is an electronic transcription/translation of spoken language to printed text.  The electronic translation of spoken language may permit erroneous, or at times, nonsensical words or phrases to be inadvertently transcribed.  Although I have reviewed the note for such errors some may still exist.    "

## 2018-02-02 RX ORDER — ATORVASTATIN CALCIUM 40 MG/1
TABLET, FILM COATED ORAL
Qty: 90 TABLET | Refills: 0 | Status: SHIPPED | OUTPATIENT
Start: 2018-02-02 | End: 2018-05-02 | Stop reason: SDUPTHER

## 2018-02-02 RX ORDER — ESCITALOPRAM OXALATE 10 MG/1
TABLET ORAL
Qty: 90 TABLET | Refills: 0 | Status: SHIPPED | OUTPATIENT
Start: 2018-02-02 | End: 2018-05-02 | Stop reason: SDUPTHER

## 2018-02-02 RX ORDER — LOSARTAN POTASSIUM 100 MG/1
TABLET ORAL
Qty: 90 TABLET | Refills: 0 | Status: SHIPPED | OUTPATIENT
Start: 2018-02-02 | End: 2018-05-02 | Stop reason: SDUPTHER

## 2018-05-01 DIAGNOSIS — E11.9 TYPE 2 DIABETES MELLITUS WITHOUT COMPLICATION, WITHOUT LONG-TERM CURRENT USE OF INSULIN (HCC): Primary | ICD-10-CM

## 2018-05-03 LAB
HBA1C MFR BLD: 7.3 % (ref 4.8–5.6)
MICROALBUMIN UR-MCNC: 3.2 UG/ML

## 2018-05-03 RX ORDER — LOSARTAN POTASSIUM 100 MG/1
TABLET ORAL
Qty: 90 TABLET | Refills: 0 | Status: SHIPPED | OUTPATIENT
Start: 2018-05-03 | End: 2018-08-07 | Stop reason: SDUPTHER

## 2018-05-03 RX ORDER — ATORVASTATIN CALCIUM 40 MG/1
TABLET, FILM COATED ORAL
Qty: 90 TABLET | Refills: 0 | Status: SHIPPED | OUTPATIENT
Start: 2018-05-03 | End: 2018-07-31 | Stop reason: SDUPTHER

## 2018-05-03 RX ORDER — ESCITALOPRAM OXALATE 10 MG/1
TABLET ORAL
Qty: 90 TABLET | Refills: 0 | Status: SHIPPED | OUTPATIENT
Start: 2018-05-03 | End: 2018-07-30 | Stop reason: SDUPTHER

## 2018-05-04 ENCOUNTER — TELEPHONE (OUTPATIENT)
Dept: SPORTS MEDICINE | Facility: CLINIC | Age: 73
End: 2018-05-04

## 2018-05-04 NOTE — TELEPHONE ENCOUNTER
----- Message from Suraj Waters MD sent at 5/3/2018  5:22 PM EDT -----  Hemoglobin A1c is stable at 7.3.  Continue current dose of metformin.  Encouraged continued caution with sugar in diet.  Ideal range for diabetes control is less than 7, but given her age and other risk factors, I think we can safely watch this range.  We should recheck in about 4 months.

## 2018-07-24 ENCOUNTER — APPOINTMENT (OUTPATIENT)
Dept: WOMENS IMAGING | Facility: HOSPITAL | Age: 73
End: 2018-07-24

## 2018-07-24 ENCOUNTER — PROCEDURE VISIT (OUTPATIENT)
Dept: OBSTETRICS AND GYNECOLOGY | Facility: CLINIC | Age: 73
End: 2018-07-24

## 2018-07-24 DIAGNOSIS — Z12.31 VISIT FOR SCREENING MAMMOGRAM: Primary | ICD-10-CM

## 2018-07-24 PROCEDURE — 77067 SCR MAMMO BI INCL CAD: CPT | Performed by: OBSTETRICS & GYNECOLOGY

## 2018-07-24 PROCEDURE — 77067 SCR MAMMO BI INCL CAD: CPT | Performed by: RADIOLOGY

## 2018-07-30 RX ORDER — ESCITALOPRAM OXALATE 10 MG/1
TABLET ORAL
Qty: 90 TABLET | Refills: 0 | Status: SHIPPED | OUTPATIENT
Start: 2018-07-30 | End: 2018-11-01 | Stop reason: SDUPTHER

## 2018-07-31 RX ORDER — ATORVASTATIN CALCIUM 40 MG/1
TABLET, FILM COATED ORAL
Qty: 90 TABLET | Refills: 0 | Status: SHIPPED | OUTPATIENT
Start: 2018-07-31 | End: 2018-11-01 | Stop reason: SDUPTHER

## 2018-08-07 RX ORDER — LOSARTAN POTASSIUM 100 MG/1
TABLET ORAL
Qty: 90 TABLET | Refills: 0 | Status: SHIPPED | OUTPATIENT
Start: 2018-08-07 | End: 2018-11-04 | Stop reason: SDUPTHER

## 2018-11-02 RX ORDER — ATORVASTATIN CALCIUM 40 MG/1
TABLET, FILM COATED ORAL
Qty: 90 TABLET | Refills: 0 | Status: SHIPPED | OUTPATIENT
Start: 2018-11-02 | End: 2019-02-04 | Stop reason: SINTOL

## 2018-11-02 RX ORDER — ESCITALOPRAM OXALATE 10 MG/1
TABLET ORAL
Qty: 90 TABLET | Refills: 0 | Status: SHIPPED | OUTPATIENT
Start: 2018-11-02 | End: 2019-01-30 | Stop reason: SDUPTHER

## 2018-11-05 RX ORDER — LOSARTAN POTASSIUM 100 MG/1
TABLET ORAL
Qty: 90 TABLET | Refills: 0 | Status: SHIPPED | OUTPATIENT
Start: 2018-11-05 | End: 2019-01-30 | Stop reason: SDUPTHER

## 2018-11-13 ENCOUNTER — OFFICE VISIT (OUTPATIENT)
Dept: SLEEP MEDICINE | Facility: HOSPITAL | Age: 73
End: 2018-11-13
Attending: PSYCHIATRY & NEUROLOGY

## 2018-11-13 VITALS
HEART RATE: 84 BPM | SYSTOLIC BLOOD PRESSURE: 153 MMHG | WEIGHT: 162 LBS | HEIGHT: 64 IN | DIASTOLIC BLOOD PRESSURE: 78 MMHG | BODY MASS INDEX: 27.66 KG/M2

## 2018-11-13 DIAGNOSIS — F32.0 MILD SINGLE CURRENT EPISODE OF MAJOR DEPRESSIVE DISORDER (HCC): ICD-10-CM

## 2018-11-13 DIAGNOSIS — G47.33 OSA (OBSTRUCTIVE SLEEP APNEA): Primary | ICD-10-CM

## 2018-11-13 DIAGNOSIS — E11.9 TYPE 2 DIABETES MELLITUS WITHOUT COMPLICATION, WITHOUT LONG-TERM CURRENT USE OF INSULIN (HCC): ICD-10-CM

## 2018-11-13 DIAGNOSIS — G35 MULTIPLE SCLEROSIS, PRIMARY PROGRESSIVE (HCC): ICD-10-CM

## 2018-11-13 PROCEDURE — G0463 HOSPITAL OUTPT CLINIC VISIT: HCPCS

## 2018-11-13 NOTE — PROGRESS NOTES
"Cesilia Goel  : 1945  [id number]     DATE OF VISIT: 2018     HISTORY OF PRESENT ILLNESS: The patient returns for followup visit. 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 She received IV infusion with OCUVEVUS in 2017 and she is under the care of Dr. Tez Mckay at [facility].    She has severe obstructive sleep apnea syndrome and her polysomnography in the past has revealed apnea-hypopnea index of 83.6 per sleep hour, minimum SpO2 of 79% and is on CPAP at 15 cm H20. She had hypersomnia with [place] Sleepiness Scale score of 9.     Compliance data indicates excellent compliance with CPAP at 15 cm H20 with 100% usage with an average usage of 8 hours and 38 minutes and AHI down to 1.9 and [place] Sleepiness Scale score is down to 4.  Patient would like to use dreamwear nasal mask.  Request for mask fit for the same.    ROS: Significant for nasal congestion, postnasal drip and cough.  PHYSICAL EXAMINATION:  Vitals:    18 1100   BP: 153/78   Pulse: 84   Weight: 73.5 kg (162 lb)   Height: 162.6 cm (64\")     HEENT: Normal.   NECK: Supple. No bruits.   CARDIAC: Normal.   LUNGS: Clear to auscultation.   EXTREMITIES: No edema. She has foot drop and spastic paraparesis.     IMPRESSION: Patient with severe obstructive sleep apnea syndrome, AHI 83.6, minimum SpO2 of 79% and on CPAP at 15 cm H20, and excellent compliance. She is compliant and benefiting from it.  We will request for a new mask fit.    RECOMMENDATIONS: Continue present CPAP. Follow up in 1 year.     Mathew Hewitt M.D.  2018     "

## 2019-01-30 RX ORDER — LOSARTAN POTASSIUM 100 MG/1
TABLET ORAL
Qty: 90 TABLET | Refills: 0 | Status: SHIPPED | OUTPATIENT
Start: 2019-01-30 | End: 2019-04-29 | Stop reason: SDUPTHER

## 2019-01-30 RX ORDER — ESCITALOPRAM OXALATE 10 MG/1
TABLET ORAL
Qty: 90 TABLET | Refills: 0 | Status: SHIPPED | OUTPATIENT
Start: 2019-01-30 | End: 2019-04-29 | Stop reason: SDUPTHER

## 2019-01-30 NOTE — TELEPHONE ENCOUNTER
Spoke with PT's  who stated that he will get the message to the PT, that she is due for A1c check anyway.   Script has been sent over per Dr. Waters's approval.

## 2019-02-04 ENCOUNTER — OFFICE VISIT (OUTPATIENT)
Dept: SPORTS MEDICINE | Facility: CLINIC | Age: 74
End: 2019-02-04

## 2019-02-04 VITALS
SYSTOLIC BLOOD PRESSURE: 136 MMHG | WEIGHT: 165 LBS | BODY MASS INDEX: 28.17 KG/M2 | DIASTOLIC BLOOD PRESSURE: 72 MMHG | HEIGHT: 64 IN

## 2019-02-04 DIAGNOSIS — I10 ESSENTIAL HYPERTENSION: ICD-10-CM

## 2019-02-04 DIAGNOSIS — F32.0 MILD SINGLE CURRENT EPISODE OF MAJOR DEPRESSIVE DISORDER (HCC): ICD-10-CM

## 2019-02-04 DIAGNOSIS — Z00.00 MEDICARE ANNUAL WELLNESS VISIT, SUBSEQUENT: Primary | ICD-10-CM

## 2019-02-04 DIAGNOSIS — G35 MULTIPLE SCLEROSIS, PRIMARY PROGRESSIVE (HCC): ICD-10-CM

## 2019-02-04 DIAGNOSIS — R09.82 POSTNASAL DRIP: ICD-10-CM

## 2019-02-04 DIAGNOSIS — E11.40 TYPE 2 DIABETES MELLITUS WITH DIABETIC NEUROPATHY, WITHOUT LONG-TERM CURRENT USE OF INSULIN (HCC): ICD-10-CM

## 2019-02-04 DIAGNOSIS — E11.9 TYPE 2 DIABETES MELLITUS WITHOUT COMPLICATION, WITHOUT LONG-TERM CURRENT USE OF INSULIN (HCC): ICD-10-CM

## 2019-02-04 DIAGNOSIS — E78.2 MIXED HYPERLIPIDEMIA: ICD-10-CM

## 2019-02-04 PROCEDURE — G0439 PPPS, SUBSEQ VISIT: HCPCS | Performed by: FAMILY MEDICINE

## 2019-02-04 PROCEDURE — 99214 OFFICE O/P EST MOD 30 MIN: CPT | Performed by: FAMILY MEDICINE

## 2019-02-04 RX ORDER — MIRABEGRON 50 MG/1
TABLET, FILM COATED, EXTENDED RELEASE ORAL DAILY
Refills: 3 | COMMUNITY
Start: 2019-01-14

## 2019-02-04 NOTE — PROGRESS NOTES
QUICK REFERENCE INFORMATION:  The ABCs of the Annual Wellness Visit    Subsequent Medicare Wellness Visit    HEALTH RISK ASSESSMENT    1945    Recent Hospitalizations:  No hospitalization(s) within the last year..        Current Medical Providers:  Patient Care Team:  Suraj Waters MD as PCP - General (Sports Medicine)  Tez Patten MD as PCP - Claims Attributed        Smoking Status:  Social History     Tobacco Use   Smoking Status Current Every Day Smoker   Smokeless Tobacco Never Used       Alcohol Consumption:  Social History     Substance and Sexual Activity   Alcohol Use No       Depression Screen:   PHQ-2/PHQ-9 Depression Screening 7/20/2017   Little interest or pleasure in doing things 0   Feeling down, depressed, or hopeless 0   Trouble falling or staying asleep, or sleeping too much 0   Feeling tired or having little energy 0   Poor appetite or overeating 0   Feeling bad about yourself - or that you are a failure or have let yourself or your family down 0   Trouble concentrating on things, such as reading the newspaper or watching television 0   Moving or speaking so slowly that other people could have noticed. Or the opposite - being so fidgety or restless that you have been moving around a lot more than usual 0   Thoughts that you would be better off dead, or of hurting yourself in some way 0   Total Score 0   If you checked off any problems, how difficult have these problems made it for you to do your work, take care of things at home, or get along with other people? Not difficult at all       Health Habits and Functional and Cognitive Screening:  Functional & Cognitive Status 2/4/2019   Do you have difficulty preparing food and eating? No   Do you have difficulty bathing yourself, getting dressed or grooming yourself? No   Do you have difficulty using the toilet? No   Do you have difficulty moving around from place to place? Yes   Do you have trouble with steps or getting out of a  bed or a chair? No   In the past year have you fallen or experienced a near fall? Yes   Current Diet Diabetic Diet   Dental Exam Up to date   Eye Exam Up to date   Exercise (times per week) 7 times per week   Current Exercise Activities Include Housecleaning   Do you need help using the phone?  No   Are you deaf or do you have serious difficulty hearing?  No   Do you need help with transportation? Yes   Do you need help shopping? Yes   Do you need help preparing meals?  Yes   Do you need help with housework?  No   Do you need help with laundry? No   Do you need help taking your medications? No   Do you need help managing money? No   Do you ever drive or ride in a car without wearing a seat belt? No           Does the patient have evidence of cognitive impairment? No    Aspirin use counseling: Would benefit from ASA given risks (smoking, DM, HLD, HTN) but currently not      Recent Lab Results:  CMP:  Lab Results   Component Value Date     (H) 03/08/2017    BUN 15 03/08/2017    CREATININE 0.59 03/08/2017    EGFRIFNONA 101 03/08/2017    EGFRIFAFRI 122 03/08/2017    BCR 25.4 (H) 03/08/2017     03/08/2017    K 4.5 03/08/2017    CO2 28.1 03/08/2017    CALCIUM 10.3 03/08/2017    PROTENTOTREF 7.0 03/08/2017    ALBUMIN 4.40 03/08/2017    LABGLOBREF 2.6 03/08/2017    LABIL2 1.7 03/08/2017    BILITOT 0.3 03/08/2017    ALKPHOS 94 03/08/2017    AST 19 03/08/2017    ALT 24 03/08/2017     Lipid Panel:  Lab Results   Component Value Date    TRIG 349 (H) 03/08/2017    HDL 46 03/08/2017    VLDL 69.8 (H) 03/08/2017     HbA1c:  Lab Results   Component Value Date    HGBA1C 7.30 (H) 05/02/2018       Visual Acuity:  No exam data present    Age-appropriate Screening Schedule:  Refer to the list below for future screening recommendations based on patient's age, sex and/or medical conditions. Orders for these recommended tests are listed in the plan section. The patient has been provided with a written plan.    Health  Maintenance   Topic Date Due   • LIPID PANEL  03/08/2018   • DIABETIC FOOT EXAM  07/20/2018   • HEMOGLOBIN A1C  11/02/2018   • PNEUMOCOCCAL VACCINES (65+ LOW/MEDIUM RISK) (2 of 2 - PPSV23) 02/04/2019 (Originally 7/20/2018)   • ZOSTER VACCINE (2 of 2) 02/04/2019 (Originally 9/14/2017)   • INFLUENZA VACCINE  02/05/2019 (Originally 8/1/2018)   • URINE MICROALBUMIN  05/02/2019   • DIABETIC EYE EXAM  06/12/2019   • MAMMOGRAM  07/24/2020   • COLONOSCOPY  07/20/2027   • TDAP/TD VACCINES (2 - Td) 07/20/2027        Subjective   History of Present Illness    Cesilia Goel is a 73 y.o. female who presents for an Subsequent Wellness Visit.    The following portions of the patient's history were reviewed and updated as appropriate: allergies, current medications, past family history, past medical history, past social history, past surgical history and problem list.    Outpatient Medications Prior to Visit   Medication Sig Dispense Refill   • Calcium Carbonate-Vitamin D 500-125 MG-UNIT tablet Take  by mouth.     • escitalopram (LEXAPRO) 10 MG tablet TAKE 1 TABLET BY MOUTH DAILY 90 tablet 0   • losartan (COZAAR) 100 MG tablet TAKE 1 TABLET BY MOUTH DAILY 90 tablet 0   • metFORMIN (GLUCOPHAGE) 1000 MG tablet TAKE 1 TABLET BY MOUTH TWICE DAILY AFTER MEAL 180 tablet 0   • Multiple Vitamin (MULTIVITAMIN) tablet Take 1 tablet by mouth.     • MYRBETRIQ 50 MG tablet sustained-release 24 hour 24 hr tablet Take  by mouth Daily.  3   • sodium chloride 0.9 % solution 250 mL with Ocrelizumab 300 MG/10ML solution Infuse  into a venous catheter.     • atorvastatin (LIPITOR) 40 MG tablet TAKE 1 TABLET BY MOUTH DAILY 90 tablet 0   • valsartan (DIOVAN) 160 MG tablet Take 160 mg by mouth.     • VESICARE 5 MG tablet Take 1 tablet by mouth Daily. 90 tablet 3     No facility-administered medications prior to visit.        Patient Active Problem List   Diagnosis   • Type 2 diabetes mellitus with diabetic neuropathy, without long-term current use  "of insulin (CMS/[facility])   • Allergic rhinitis   • Mild single current episode of major depressive disorder (CMS/[facility])   • Dermatitis, eczematoid   • HLD (hyperlipidemia)   • Hypertension   • Multiple sclerosis, primary progressive (CMS/[facility])   • Absence of bladder continence   • LOUIE (obstructive sleep apnea)   • Hypersomnia       Advance Care Planning:  power of  for healthcare on file    Identification of Risk Factors:  Risk factors include: weight , cardiovascular risk and tobacco use.    Review of Systems   Constitutional: Negative.    Respiratory: Negative.    Cardiovascular: Negative.    Musculoskeletal: Negative.    Neurological:        Chronic foot drop with MS   Psychiatric/Behavioral: The patient is not nervous/anxious.        Compared to one year ago, the patient feels her physical health is better.  Compared to one year ago, the patient feels her mental health is better.    Objective     Physical Exam   Constitutional: She is oriented to person, place, and time. She appears well-developed and well-nourished.   Eyes: Conjunctivae are normal. Pupils are equal, round, and reactive to light.   Neck: Normal range of motion.   Cardiovascular: Normal rate, regular rhythm and normal heart sounds.   Pulmonary/Chest: Effort normal and breath sounds normal.   Musculoskeletal:   Gait stable with cane   Neurological: She is alert and oriented to person, place, and time.   Skin: Skin is warm and dry.   Psychiatric: She has a normal mood and affect.   Vitals reviewed.      Vitals:    02/04/19 1258   BP: 136/72   BP Location: Left arm   Patient Position: Sitting   Cuff Size: Adult   Weight: 74.8 kg (165 lb)   Height: 162.6 cm (64.02\")       Patient's Body mass index is 28.31 kg/m². BMI is above normal parameters. Recommendations include: exercise counseling and nutrition counseling.      Assessment/Plan   Patient Self-Management and Personalized Health Advice  The patient has been provided with information about: " diet, exercise, prevention of cardiac or vascular disease, tobacco cessation and mental health concerns and preventive services including:   · Diabetes screening, see lab orders, Exercise counseling provided, Influenza vaccine, Nutrition counseling provided, Pneumococcal vaccine .    Visit Diagnoses:    ICD-10-CM ICD-9-CM   1. Medicare annual wellness visit, subsequent Z00.00 V70.0   2. Type 2 diabetes mellitus without complication, without long-term current use of insulin (CMS/HCC) E11.9 250.00   3. Essential hypertension I10 401.9   4. Mixed hyperlipidemia E78.2 272.2   5. Postnasal drip R09.82 784.91   6. Multiple sclerosis, primary progressive (CMS/HCC) G35 340   7. Type 2 diabetes mellitus with diabetic neuropathy, without long-term current use of insulin (CMS/HCC) E11.40 250.60     357.2   8. Mild single current episode of major depressive disorder (CMS/HCC) F32.0 296.21       Orders Placed This Encounter   Procedures   • Hemoglobin A1c   • MicroAlbumin, Urine, Random - Urine, Clean Catch   • Comprehensive Metabolic Panel   • Thyroid Cascade Profile   • Lipid Panel With / Chol / HDL Ratio   • CBC & Differential     Order Specific Question:   Manual Differential     Answer:   No       Outpatient Encounter Medications as of 2/4/2019   Medication Sig Dispense Refill   • Calcium Carbonate-Vitamin D 500-125 MG-UNIT tablet Take  by mouth.     • escitalopram (LEXAPRO) 10 MG tablet TAKE 1 TABLET BY MOUTH DAILY 90 tablet 0   • losartan (COZAAR) 100 MG tablet TAKE 1 TABLET BY MOUTH DAILY 90 tablet 0   • metFORMIN (GLUCOPHAGE) 1000 MG tablet TAKE 1 TABLET BY MOUTH TWICE DAILY AFTER MEAL 180 tablet 0   • Multiple Vitamin (MULTIVITAMIN) tablet Take 1 tablet by mouth.     • MYRBETRIQ 50 MG tablet sustained-release 24 hour 24 hr tablet Take  by mouth Daily.  3   • sodium chloride 0.9 % solution 250 mL with Ocrelizumab 300 MG/10ML solution Infuse  into a venous catheter.     • [DISCONTINUED] atorvastatin (LIPITOR) 40 MG  tablet TAKE 1 TABLET BY MOUTH DAILY 90 tablet 0   • [DISCONTINUED] valsartan (DIOVAN) 160 MG tablet Take 160 mg by mouth.     • [DISCONTINUED] VESICARE 5 MG tablet Take 1 tablet by mouth Daily. 90 tablet 3     No facility-administered encounter medications on file as of 2/4/2019.        Reviewed use of high risk medication in the elderly: not applicable  Reviewed for potential of harmful drug interactions in the elderly: not applicable    Follow Up:  Return in about 3 months (around 5/4/2019) for Recheck lipids (1 week prior).     An After Visit Summary and PPPS with all of these plans were given to the patient.            In addition to AWV also following up on chronic medical problems:    Continues MS f/u with Dr. Halle LIZ - Recently stopped statin due to concern of side effects including muscle aches/weakness.      Coughing and postnasal drainage, worse in the morning. Sneezing frequently.     She and her  also note some recent trouble with short term memory.     DM - overall stable. No complaints. Trying to adhere to diet but admits to less control with holidays.     A/P:  HLD - Agree to stop and recheck in 3-4 months. Discussed balancing risks/benefits for long term cardiovascular risk given HTN, DM, HLD, and smoking. Again reminded of smoking risks.     Suspect postnasal drainage; rec otc fluticasone 2 sprays per nostril daily.     After further discussion, memory concerns are debatable. They are going to pursue repeat neuropsych eval for comparison to last study several years ago.     DM - recheck labs today; recommend diet adherence    Pt again declines vaccines despite discussing benefits.

## 2019-02-05 LAB
ALBUMIN SERPL-MCNC: 4.2 G/DL (ref 3.5–5.2)
ALBUMIN/GLOB SERPL: 1.9 G/DL
ALP SERPL-CCNC: 97 U/L (ref 39–117)
ALT SERPL-CCNC: 35 U/L (ref 1–33)
AST SERPL-CCNC: 28 U/L (ref 1–32)
BASOPHILS # BLD AUTO: 0.04 10*3/MM3 (ref 0–0.2)
BASOPHILS NFR BLD AUTO: 0.4 % (ref 0–1.5)
BILIRUB SERPL-MCNC: <0.2 MG/DL (ref 0.1–1.2)
BUN SERPL-MCNC: 12 MG/DL (ref 8–23)
BUN/CREAT SERPL: 21.1 (ref 7–25)
CALCIUM SERPL-MCNC: 9.8 MG/DL (ref 8.6–10.5)
CHLORIDE SERPL-SCNC: 100 MMOL/L (ref 98–107)
CHOLEST SERPL-MCNC: 242 MG/DL (ref 0–200)
CHOLEST/HDLC SERPL: 5.63 {RATIO}
CO2 SERPL-SCNC: 25.1 MMOL/L (ref 22–29)
CREAT SERPL-MCNC: 0.57 MG/DL (ref 0.57–1)
EOSINOPHIL # BLD AUTO: 0.36 10*3/MM3 (ref 0–0.7)
EOSINOPHIL NFR BLD AUTO: 3.8 % (ref 0.3–6.2)
ERYTHROCYTE [DISTWIDTH] IN BLOOD BY AUTOMATED COUNT: 14.4 % (ref 11.7–13)
GLOBULIN SER CALC-MCNC: 2.2 GM/DL
GLUCOSE SERPL-MCNC: 210 MG/DL (ref 65–99)
HBA1C MFR BLD: 7.76 % (ref 4.8–5.6)
HCT VFR BLD AUTO: 41.4 % (ref 35.6–45.5)
HDLC SERPL-MCNC: 43 MG/DL (ref 40–60)
HGB BLD-MCNC: 12.8 G/DL (ref 11.9–15.5)
IMM GRANULOCYTES # BLD AUTO: 0.02 10*3/MM3 (ref 0–0.03)
IMM GRANULOCYTES NFR BLD AUTO: 0.2 % (ref 0–0.5)
LDLC SERPL CALC-MCNC: ABNORMAL MG/DL
LYMPHOCYTES # BLD AUTO: 3.03 10*3/MM3 (ref 0.9–4.8)
LYMPHOCYTES NFR BLD AUTO: 31.8 % (ref 19.6–45.3)
MCH RBC QN AUTO: 28.8 PG (ref 26.9–32)
MCHC RBC AUTO-ENTMCNC: 30.9 G/DL (ref 32.4–36.3)
MCV RBC AUTO: 93.2 FL (ref 80.5–98.2)
MICROALBUMIN UR-MCNC: <3 UG/ML
MONOCYTES # BLD AUTO: 0.49 10*3/MM3 (ref 0.2–1.2)
MONOCYTES NFR BLD AUTO: 5.1 % (ref 5–12)
NEUTROPHILS # BLD AUTO: 5.58 10*3/MM3 (ref 1.9–8.1)
NEUTROPHILS NFR BLD AUTO: 58.7 % (ref 42.7–76)
PLATELET # BLD AUTO: 273 10*3/MM3 (ref 140–500)
POTASSIUM SERPL-SCNC: 4.3 MMOL/L (ref 3.5–5.2)
PROT SERPL-MCNC: 6.4 G/DL (ref 6–8.5)
RBC # BLD AUTO: 4.44 10*6/MM3 (ref 3.9–5.2)
SODIUM SERPL-SCNC: 142 MMOL/L (ref 136–145)
TRIGL SERPL-MCNC: 595 MG/DL (ref 0–150)
TSH SERPL DL<=0.005 MIU/L-ACNC: 1.01 UIU/ML (ref 0.45–4.5)
VLDLC SERPL CALC-MCNC: ABNORMAL MG/DL
WBC # BLD AUTO: 9.52 10*3/MM3 (ref 4.5–10.7)

## 2019-04-29 RX ORDER — LOSARTAN POTASSIUM 100 MG/1
TABLET ORAL
Qty: 90 TABLET | Refills: 0 | Status: SHIPPED | OUTPATIENT
Start: 2019-04-29 | End: 2019-08-05 | Stop reason: SDUPTHER

## 2019-04-29 RX ORDER — ESCITALOPRAM OXALATE 10 MG/1
TABLET ORAL
Qty: 90 TABLET | Refills: 0 | Status: SHIPPED | OUTPATIENT
Start: 2019-04-29 | End: 2019-08-05 | Stop reason: SDUPTHER

## 2019-07-18 ENCOUNTER — OFFICE VISIT (OUTPATIENT)
Dept: SPORTS MEDICINE | Facility: CLINIC | Age: 74
End: 2019-07-18

## 2019-07-18 VITALS
WEIGHT: 165 LBS | HEIGHT: 64 IN | SYSTOLIC BLOOD PRESSURE: 154 MMHG | BODY MASS INDEX: 28.17 KG/M2 | OXYGEN SATURATION: 95 % | DIASTOLIC BLOOD PRESSURE: 82 MMHG | HEART RATE: 86 BPM

## 2019-07-18 DIAGNOSIS — I10 ESSENTIAL HYPERTENSION: ICD-10-CM

## 2019-07-18 DIAGNOSIS — E11.40 TYPE 2 DIABETES MELLITUS WITH DIABETIC NEUROPATHY, WITHOUT LONG-TERM CURRENT USE OF INSULIN (HCC): Primary | ICD-10-CM

## 2019-07-18 DIAGNOSIS — E78.2 MIXED HYPERLIPIDEMIA: ICD-10-CM

## 2019-07-18 PROCEDURE — 99214 OFFICE O/P EST MOD 30 MIN: CPT | Performed by: FAMILY MEDICINE

## 2019-07-18 NOTE — PROGRESS NOTES
"Cesilia is a 73 y.o. year old female follow up on a problem familiar to this examiner.     Chief Complaint   Patient presents with   • Diabetes   • Hypertension       History of Present Illness   HPI   F/u HTN - no sx or se.   F/u DM - home check 170 this morning, 154 earlier this week fasting. Tolerating meds, trying to optimize diet  F/u HLD - stopped lipitor last visit due to possible myalgias. Has not seen any change since then.     I have reviewed the patient's medical, family, and social history in detail and updated the computerized patient record.    Review of Systems   Constitutional: Negative.    Respiratory: Negative.    Cardiovascular: Negative.        /82   Pulse 86   Ht 162.6 cm (64.02\")   Wt 74.8 kg (165 lb)   SpO2 95%   BMI 28.31 kg/m²      Physical Exam   Constitutional: She is oriented to person, place, and time. She appears well-developed and well-nourished.   Neck: Normal range of motion.   Cardiovascular: Normal rate, regular rhythm and normal heart sounds.   Pulmonary/Chest: Effort normal and breath sounds normal.   Neurological: She is alert and oriented to person, place, and time.   Skin: Skin is warm and dry.   Psychiatric: She has a normal mood and affect.   Vitals reviewed.        Current Outpatient Medications:   •  Calcium Carbonate-Vitamin D 500-125 MG-UNIT tablet, Take  by mouth., Disp: , Rfl:   •  escitalopram (LEXAPRO) 10 MG tablet, TAKE 1 TABLET BY MOUTH DAILY, Disp: 90 tablet, Rfl: 0  •  losartan (COZAAR) 100 MG tablet, TAKE 1 TABLET BY MOUTH DAILY, Disp: 90 tablet, Rfl: 0  •  metFORMIN (GLUCOPHAGE) 1000 MG tablet, TAKE 1 TABLET BY MOUTH TWICE DAILY AFTER MEAL, Disp: 180 tablet, Rfl: 0  •  Multiple Vitamin (MULTIVITAMIN) tablet, Take 1 tablet by mouth., Disp: , Rfl:   •  MYRBETRIQ 50 MG tablet sustained-release 24 hour 24 hr tablet, Take  by mouth Daily., Disp: , Rfl: 3  •  sodium chloride 0.9 % solution 250 mL with Ocrelizumab 300 MG/10ML solution, Infuse  into a " venous catheter., Disp: , Rfl:   •  atorvastatin (LIPITOR) 20 MG tablet, Take 1 tablet by mouth Daily., Disp: 30 tablet, Rfl: 5  •  glipiZIDE (GLUCOTROL) 5 MG tablet, Take 1 tablet by mouth Daily., Disp: 30 tablet, Rfl: 5     Diagnoses and all orders for this visit:    Type 2 diabetes mellitus with diabetic neuropathy, without long-term current use of insulin (CMS/[facility])  -     Lipid Panel With / Chol / HDL Ratio  -     Comprehensive Metabolic Panel  -     CBC & Differential  -     Hemoglobin A1c  -     MicroAlbumin, Urine, Random - Urine, Clean Catch    Essential hypertension  -     Lipid Panel With / Chol / HDL Ratio  -     Comprehensive Metabolic Panel  -     CBC & Differential  -     Hemoglobin A1c  -     MicroAlbumin, Urine, Random - Urine, Clean Catch    Mixed hyperlipidemia  -     Lipid Panel With / Chol / HDL Ratio  -     Comprehensive Metabolic Panel  -     CBC & Differential  -     Hemoglobin A1c  -     MicroAlbumin, Urine, Random - Urine, Clean Catch       Continue current treatment plan.  May need to optimize her diabetes control and need to consider resuming statin.  Discussed pros and cons and treatment consideration of cardiovascular risk reduction and changes over time with age.      EMR Dragon/Transcription disclaimer:    Much of this encounter note is an electronic transcription/translation of spoken language to printed text.  The electronic translation of spoken language may permit erroneous, or at times, nonsensical words or phrases to be inadvertently transcribed.  Although I have reviewed the note for such errors some may still exist.

## 2019-07-19 LAB
ALBUMIN SERPL-MCNC: 4.1 G/DL (ref 3.5–5.2)
ALBUMIN/GLOB SERPL: 1.5 G/DL
ALP SERPL-CCNC: 92 U/L (ref 39–117)
ALT SERPL-CCNC: 43 U/L (ref 1–33)
AST SERPL-CCNC: 37 U/L (ref 1–32)
BASOPHILS # BLD AUTO: 0.05 10*3/MM3 (ref 0–0.2)
BASOPHILS NFR BLD AUTO: 0.5 % (ref 0–1.5)
BILIRUB SERPL-MCNC: 0.2 MG/DL (ref 0.2–1.2)
BUN SERPL-MCNC: 13 MG/DL (ref 8–23)
BUN/CREAT SERPL: 25.5 (ref 7–25)
CALCIUM SERPL-MCNC: 9.7 MG/DL (ref 8.6–10.5)
CHLORIDE SERPL-SCNC: 100 MMOL/L (ref 98–107)
CHOLEST SERPL-MCNC: 294 MG/DL (ref 0–200)
CHOLEST/HDLC SERPL: 6.39 {RATIO}
CO2 SERPL-SCNC: 25.8 MMOL/L (ref 22–29)
CREAT SERPL-MCNC: 0.51 MG/DL (ref 0.57–1)
EOSINOPHIL # BLD AUTO: 0.32 10*3/MM3 (ref 0–0.4)
EOSINOPHIL NFR BLD AUTO: 3.5 % (ref 0.3–6.2)
ERYTHROCYTE [DISTWIDTH] IN BLOOD BY AUTOMATED COUNT: 14.2 % (ref 12.3–15.4)
GLOBULIN SER CALC-MCNC: 2.7 GM/DL
GLUCOSE SERPL-MCNC: 147 MG/DL (ref 65–99)
HBA1C MFR BLD: 7.47 % (ref 4.8–5.6)
HCT VFR BLD AUTO: 42.5 % (ref 34–46.6)
HDLC SERPL-MCNC: 46 MG/DL (ref 40–60)
HGB BLD-MCNC: 13.1 G/DL (ref 12–15.9)
IMM GRANULOCYTES # BLD AUTO: 0.08 10*3/MM3 (ref 0–0.05)
IMM GRANULOCYTES NFR BLD AUTO: 0.9 % (ref 0–0.5)
LDLC SERPL CALC-MCNC: ABNORMAL MG/DL
LYMPHOCYTES # BLD AUTO: 2.51 10*3/MM3 (ref 0.7–3.1)
LYMPHOCYTES NFR BLD AUTO: 27.6 % (ref 19.6–45.3)
MCH RBC QN AUTO: 28.5 PG (ref 26.6–33)
MCHC RBC AUTO-ENTMCNC: 30.8 G/DL (ref 31.5–35.7)
MCV RBC AUTO: 92.6 FL (ref 79–97)
MICROALBUMIN UR-MCNC: 4.7 UG/ML
MONOCYTES # BLD AUTO: 0.55 10*3/MM3 (ref 0.1–0.9)
MONOCYTES NFR BLD AUTO: 6 % (ref 5–12)
NEUTROPHILS # BLD AUTO: 5.6 10*3/MM3 (ref 1.7–7)
NEUTROPHILS NFR BLD AUTO: 61.5 % (ref 42.7–76)
NRBC BLD AUTO-RTO: 0.1 /100 WBC (ref 0–0.2)
PLATELET # BLD AUTO: 258 10*3/MM3 (ref 140–450)
POTASSIUM SERPL-SCNC: 4.3 MMOL/L (ref 3.5–5.2)
PROT SERPL-MCNC: 6.8 G/DL (ref 6–8.5)
RBC # BLD AUTO: 4.59 10*6/MM3 (ref 3.77–5.28)
SODIUM SERPL-SCNC: 140 MMOL/L (ref 136–145)
TRIGL SERPL-MCNC: 570 MG/DL (ref 0–150)
VLDLC SERPL CALC-MCNC: ABNORMAL MG/DL
WBC # BLD AUTO: 9.11 10*3/MM3 (ref 3.4–10.8)

## 2019-07-23 RX ORDER — ATORVASTATIN CALCIUM 20 MG/1
20 TABLET, FILM COATED ORAL DAILY
Qty: 30 TABLET | Refills: 5 | Status: SHIPPED | OUTPATIENT
Start: 2019-07-23 | End: 2020-01-17

## 2019-07-23 RX ORDER — GLIPIZIDE 5 MG/1
5 TABLET ORAL DAILY
Qty: 30 TABLET | Refills: 5 | Status: SHIPPED | OUTPATIENT
Start: 2019-07-23 | End: 2020-02-03

## 2019-07-24 ENCOUNTER — TELEPHONE (OUTPATIENT)
Dept: SPORTS MEDICINE | Facility: CLINIC | Age: 74
End: 2019-07-24

## 2019-08-05 RX ORDER — LOSARTAN POTASSIUM 100 MG/1
TABLET ORAL
Qty: 90 TABLET | Refills: 0 | Status: SHIPPED | OUTPATIENT
Start: 2019-08-05 | End: 2019-11-08 | Stop reason: SDUPTHER

## 2019-08-05 RX ORDER — ESCITALOPRAM OXALATE 10 MG/1
TABLET ORAL
Qty: 90 TABLET | Refills: 0 | Status: SHIPPED | OUTPATIENT
Start: 2019-08-05 | End: 2019-11-06 | Stop reason: SDUPTHER

## 2019-11-07 RX ORDER — ESCITALOPRAM OXALATE 10 MG/1
TABLET ORAL
Qty: 90 TABLET | Refills: 1 | Status: SHIPPED | OUTPATIENT
Start: 2019-11-07 | End: 2020-05-04

## 2019-11-08 RX ORDER — LOSARTAN POTASSIUM 100 MG/1
TABLET ORAL
Qty: 90 TABLET | Refills: 1 | Status: SHIPPED | OUTPATIENT
Start: 2019-11-08 | End: 2020-05-04

## 2019-11-19 ENCOUNTER — OFFICE VISIT (OUTPATIENT)
Dept: SLEEP MEDICINE | Facility: HOSPITAL | Age: 74
End: 2019-11-19
Attending: PSYCHIATRY & NEUROLOGY

## 2019-11-19 VITALS
OXYGEN SATURATION: 93 % | BODY MASS INDEX: 28.34 KG/M2 | SYSTOLIC BLOOD PRESSURE: 163 MMHG | HEART RATE: 97 BPM | WEIGHT: 166 LBS | HEIGHT: 64 IN | DIASTOLIC BLOOD PRESSURE: 69 MMHG

## 2019-11-19 DIAGNOSIS — G47.33 OSA (OBSTRUCTIVE SLEEP APNEA): Primary | ICD-10-CM

## 2019-11-19 DIAGNOSIS — G35 MULTIPLE SCLEROSIS, PRIMARY PROGRESSIVE (HCC): ICD-10-CM

## 2019-11-19 PROCEDURE — G0463 HOSPITAL OUTPT CLINIC VISIT: HCPCS

## 2019-11-19 NOTE — PROGRESS NOTES
"Cesilia Goel  : 1945  [id number]     DATE OF VISIT: 2019     HISTORY OF PRESENT ILLNESS: The patient returns for followup visit. 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 She received IV infusion with OCUVEVUS in 2017 and she is under the care of Dr. Tez Mckay at [facility].    She has severe obstructive sleep apnea syndrome and her polysomnography in the past has revealed apnea-hypopnea index of AHI 83.6 per sleep hour, minimum SpO2 of 79% and is on CPAP at 15 cm H20. She had hypersomnia with [place] Sleepiness Scale score of 9.     Compliance data indicates excellent compliance with CPAP at 15 cm H20 with 100% usage with an average usage of 8 hours and 43 minutes and AHI down to 4.8 and [place] Sleepiness Scale score is down to 4.     ROS: Significant for nasal congestion, postnasal drip and cough and acid reflux.  PHYSICAL EXAMINATION:  Vitals:    19 1148   BP: 163/69   Pulse: 97   SpO2: 93%   Weight: 75.3 kg (166 lb)   Height: 162.6 cm (64\")   HEENT: Normal.   NECK: Supple. No bruits.   CARDIAC: Normal.   LUNGS: Clear to auscultation.   EXTREMITIES: No edema. She has left foot drop and spastic paraparesis.     IMPRESSION: Patient with severe obstructive sleep apnea syndrome, AHI 83.6, minimum SpO2 of 79% and on CPAP at 15 cm H20, and excellent compliance. She is compliant and benefiting from it.      RECOMMENDATIONS: Continue present CPAP. Follow up in 1 year.     Mathew Hewitt M.D.  2019     "

## 2020-01-17 RX ORDER — ATORVASTATIN CALCIUM 20 MG/1
20 TABLET, FILM COATED ORAL DAILY
Qty: 30 TABLET | Refills: 5 | Status: SHIPPED | OUTPATIENT
Start: 2020-01-17 | End: 2020-07-17

## 2020-01-29 ENCOUNTER — OFFICE VISIT (OUTPATIENT)
Dept: SPORTS MEDICINE | Facility: CLINIC | Age: 75
End: 2020-01-29

## 2020-01-29 VITALS
WEIGHT: 165 LBS | HEIGHT: 64 IN | BODY MASS INDEX: 28.17 KG/M2 | HEART RATE: 92 BPM | OXYGEN SATURATION: 97 % | SYSTOLIC BLOOD PRESSURE: 138 MMHG | DIASTOLIC BLOOD PRESSURE: 78 MMHG

## 2020-01-29 DIAGNOSIS — E11.40 TYPE 2 DIABETES MELLITUS WITH DIABETIC NEUROPATHY, WITHOUT LONG-TERM CURRENT USE OF INSULIN (HCC): ICD-10-CM

## 2020-01-29 DIAGNOSIS — R05.9 COUGH: Primary | ICD-10-CM

## 2020-01-29 DIAGNOSIS — E78.2 MIXED HYPERLIPIDEMIA: ICD-10-CM

## 2020-01-29 PROCEDURE — 71046 X-RAY EXAM CHEST 2 VIEWS: CPT | Performed by: FAMILY MEDICINE

## 2020-01-29 PROCEDURE — 99214 OFFICE O/P EST MOD 30 MIN: CPT | Performed by: FAMILY MEDICINE

## 2020-01-30 LAB
CHOLEST SERPL-MCNC: 189 MG/DL (ref 100–199)
CHOLEST/HDLC SERPL: 3.7 RATIO (ref 0–4.4)
HBA1C MFR BLD: 7.3 % (ref 4.8–5.6)
HDLC SERPL-MCNC: 51 MG/DL
LDLC SERPL CALC-MCNC: 79 MG/DL (ref 0–99)
MICROALBUMIN UR-MCNC: 3.6 UG/ML
TRIGL SERPL-MCNC: 293 MG/DL (ref 0–149)
VLDLC SERPL CALC-MCNC: 59 MG/DL (ref 5–40)

## 2020-01-31 ENCOUNTER — TELEPHONE (OUTPATIENT)
Dept: SPORTS MEDICINE | Facility: CLINIC | Age: 75
End: 2020-01-31

## 2020-01-31 NOTE — TELEPHONE ENCOUNTER
Relayed to pt, Labs are stable, better than before.  Cholesterol has responded very well to medication.  Keep up the good work        ----- Message from Suraj Waters MD sent at 1/30/2020  5:24 PM EST -----  Labs are stable, better than before.  Cholesterol has responded very well to medication.  Keep up the good work

## 2020-02-03 RX ORDER — GLIPIZIDE 5 MG/1
5 TABLET ORAL DAILY
Qty: 30 TABLET | Refills: 5 | Status: SHIPPED | OUTPATIENT
Start: 2020-02-03 | End: 2020-07-31

## 2020-04-06 ENCOUNTER — TELEPHONE (OUTPATIENT)
Dept: SPORTS MEDICINE | Facility: CLINIC | Age: 75
End: 2020-04-06

## 2020-04-06 NOTE — TELEPHONE ENCOUNTER
pts spouse called stating that he would like a prescription for plaquenil or the medication preventing covid 19. Would like to speak with you if he can pt has MS and DM and is worried this will kill her. [phone]. JJ

## 2020-05-04 RX ORDER — ESCITALOPRAM OXALATE 10 MG/1
TABLET ORAL
Qty: 90 TABLET | Refills: 3 | Status: SHIPPED | OUTPATIENT
Start: 2020-05-04 | End: 2021-04-26

## 2020-05-04 RX ORDER — LOSARTAN POTASSIUM 100 MG/1
TABLET ORAL
Qty: 90 TABLET | Refills: 3 | Status: SHIPPED | OUTPATIENT
Start: 2020-05-04 | End: 2021-04-26

## 2020-07-08 DIAGNOSIS — E11.40 TYPE 2 DIABETES MELLITUS WITH DIABETIC NEUROPATHY, WITHOUT LONG-TERM CURRENT USE OF INSULIN (HCC): Primary | ICD-10-CM

## 2020-07-10 LAB — HBA1C MFR BLD: 9.2 % (ref 4.8–5.6)

## 2020-07-17 RX ORDER — ATORVASTATIN CALCIUM 20 MG/1
20 TABLET, FILM COATED ORAL DAILY
Qty: 30 TABLET | Refills: 5 | Status: SHIPPED | OUTPATIENT
Start: 2020-07-17 | End: 2021-04-07

## 2020-07-31 RX ORDER — GLIPIZIDE 5 MG/1
5 TABLET ORAL DAILY
Qty: 30 TABLET | Refills: 5 | Status: SHIPPED | OUTPATIENT
Start: 2020-07-31 | End: 2020-10-01

## 2020-09-30 ENCOUNTER — OFFICE VISIT (OUTPATIENT)
Dept: SPORTS MEDICINE | Facility: CLINIC | Age: 75
End: 2020-09-30

## 2020-09-30 VITALS
SYSTOLIC BLOOD PRESSURE: 124 MMHG | HEIGHT: 64 IN | HEART RATE: 96 BPM | OXYGEN SATURATION: 98 % | DIASTOLIC BLOOD PRESSURE: 80 MMHG | WEIGHT: 165 LBS | BODY MASS INDEX: 28.17 KG/M2

## 2020-09-30 DIAGNOSIS — E11.40 TYPE 2 DIABETES MELLITUS WITH DIABETIC NEUROPATHY, WITHOUT LONG-TERM CURRENT USE OF INSULIN (HCC): Primary | ICD-10-CM

## 2020-09-30 PROCEDURE — 99213 OFFICE O/P EST LOW 20 MIN: CPT | Performed by: FAMILY MEDICINE

## 2020-09-30 NOTE — PROGRESS NOTES
"Cesilia is a 75 y.o. year old female follow up on a problem familiar to this examiner.     Chief Complaint   Patient presents with   • Follow-up     f/u review for blood sugar and A1C        History of Present Illness   HPI   F/u diabetes. 154 this morning - better than recently; admits to poor adherence to diet/exercise. Denies any symptoms. No hypo episodes.     I have reviewed the patient's medical, family, and social history in detail and updated the computerized patient record.    Review of Systems   Respiratory: Negative.    Cardiovascular: Negative.        /80 (BP Location: Left arm, Patient Position: Sitting, Cuff Size: Adult)   Pulse 96   Ht 162.6 cm (64\")   Wt 74.8 kg (165 lb)   SpO2 98%   BMI 28.32 kg/m²      Physical Exam  Vitals signs reviewed.   Constitutional:       Appearance: Normal appearance.   Cardiovascular:      Rate and Rhythm: Normal rate and regular rhythm.      Heart sounds: Normal heart sounds.   Pulmonary:      Effort: Pulmonary effort is normal.      Breath sounds: Normal breath sounds.   Neurological:      Mental Status: She is alert.   Psychiatric:         Mood and Affect: Mood normal.           Current Outpatient Medications:   •  atorvastatin (LIPITOR) 20 MG tablet, TAKE 1 TABLET BY MOUTH DAILY, Disp: 30 tablet, Rfl: 5  •  Calcium Carbonate-Vitamin D 500-125 MG-UNIT tablet, Take  by mouth., Disp: , Rfl:   •  escitalopram (LEXAPRO) 10 MG tablet, TAKE 1 TABLET BY MOUTH DAILY, Disp: 90 tablet, Rfl: 3  •  glipizide (GLUCOTROL) 5 MG tablet, TAKE 1 TABLET BY MOUTH DAILY, Disp: 30 tablet, Rfl: 5  •  losartan (COZAAR) 100 MG tablet, TAKE 1 TABLET BY MOUTH DAILY, Disp: 90 tablet, Rfl: 3  •  metFORMIN (GLUCOPHAGE) 1000 MG tablet, TAKE 1 TABLET BY MOUTH TWICE DAILY AFTER A MEAL, Disp: 180 tablet, Rfl: 1  •  Multiple Vitamin (MULTIVITAMIN) tablet, Take 1 tablet by mouth., Disp: , Rfl:   •  MYRBETRIQ 50 MG tablet sustained-release 24 hour 24 hr tablet, Take  by mouth Daily., Disp: , " Rfl: 3  •  sodium chloride 0.9 % solution 250 mL with Ocrelizumab 300 MG/10ML solution, Infuse  into a venous catheter., Disp: , Rfl:      Diagnoses and all orders for this visit:    Type 2 diabetes mellitus with diabetic neuropathy, without long-term current use of insulin (CMS/[facility])  -     Hemoglobin A1c  -     Microalbumin / Creatinine Urine Ratio - Urine, Clean Catch       Discussed importance of diabetes management.  Check A1c today.    Patient and her  again refused flu vaccine.      EMR Dragon/Transcription disclaimer:    Much of this encounter note is an electronic transcription/translation of spoken language to printed text.  The electronic translation of spoken language may permit erroneous, or at times, nonsensical words or phrases to be inadvertently transcribed.  Although I have reviewed the note for such errors some may still exist.

## 2020-10-01 LAB
ALBUMIN/CREAT UR: 12 MG/G CREAT (ref 0–29)
CREAT UR-MCNC: 53.8 MG/DL
HBA1C MFR BLD: 8.7 % (ref 4.8–5.6)
MICROALBUMIN UR-MCNC: 6.2 UG/ML

## 2020-10-01 RX ORDER — GLIPIZIDE 5 MG/1
5 TABLET ORAL
Qty: 180 TABLET | Refills: 1 | Status: SHIPPED | OUTPATIENT
Start: 2020-10-01 | End: 2021-04-26

## 2020-11-17 ENCOUNTER — OFFICE VISIT (OUTPATIENT)
Dept: SLEEP MEDICINE | Facility: HOSPITAL | Age: 75
End: 2020-11-17

## 2020-11-17 VITALS
BODY MASS INDEX: 28.68 KG/M2 | OXYGEN SATURATION: 94 % | SYSTOLIC BLOOD PRESSURE: 156 MMHG | HEART RATE: 96 BPM | WEIGHT: 168 LBS | HEIGHT: 64 IN | DIASTOLIC BLOOD PRESSURE: 85 MMHG

## 2020-11-17 DIAGNOSIS — F32.0 MILD SINGLE CURRENT EPISODE OF MAJOR DEPRESSIVE DISORDER (HCC): ICD-10-CM

## 2020-11-17 DIAGNOSIS — G35 MULTIPLE SCLEROSIS, PRIMARY PROGRESSIVE (HCC): ICD-10-CM

## 2020-11-17 DIAGNOSIS — G47.33 OSA (OBSTRUCTIVE SLEEP APNEA): Primary | ICD-10-CM

## 2020-11-17 PROCEDURE — G0463 HOSPITAL OUTPT CLINIC VISIT: HCPCS

## 2020-11-17 NOTE — PROGRESS NOTES
"Cesilia Goel  : 1945  [id number]     DATE OF VISIT: 2020     HISTORY OF PRESENT ILLNESS: The patient returns for followup visit. 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 She received IV infusion with OCUVEVUS in 2017 and she is under the care of Dr. Tez Mckay at [facility].    She has severe obstructive sleep apnea syndrome and her polysomnography in the past has revealed apnea-hypopnea index of AHI 83.6 per sleep hour, minimum SpO2 of 79% and is on CPAP at 15 cm H20. She had hypersomnia with [place] Sleepiness Scale score of 9.     Compliance data indicates excellent compliance with CPAP at 10.2 cm H20 with 100% usage with an average usage of 9 hours and 5 minutes and AHI down to 5.4 and [place] Sleepiness Scale score is down to 4.     ROS: Significant for nasal congestion, postnasal drip and cough and acid reflux.  PHYSICAL EXAMINATION:  Vitals:    20 1134   BP: 156/85   Pulse: 96   SpO2: 94%   Weight: 76.2 kg (168 lb)   Height: 162.6 cm (64\")   HEENT: Normal.   NECK: Supple. No bruits.   CARDIAC: Normal.   LUNGS: Clear to auscultation.   EXTREMITIES: No edema. She has left foot drop and spastic paraparesis.     IMPRESSION: Patient with severe obstructive sleep apnea syndrome, AHI 83.6, minimum SpO2 of 79% and on CPAP at 15 cm H20, and excellent compliance. She is compliant and benefiting from it.      RECOMMENDATIONS: Continue present CPAP. Follow up in 1 year.     Mathew Hewitt M.D.  2020     "

## 2021-02-16 DIAGNOSIS — E11.40 TYPE 2 DIABETES MELLITUS WITH DIABETIC NEUROPATHY, WITHOUT LONG-TERM CURRENT USE OF INSULIN (HCC): ICD-10-CM

## 2021-02-16 DIAGNOSIS — E11.40 TYPE 2 DIABETES MELLITUS WITH DIABETIC NEUROPATHY, WITHOUT LONG-TERM CURRENT USE OF INSULIN (HCC): Primary | ICD-10-CM

## 2021-03-16 ENCOUNTER — TELEMEDICINE (OUTPATIENT)
Dept: SPORTS MEDICINE | Facility: CLINIC | Age: 76
End: 2021-03-16

## 2021-03-16 DIAGNOSIS — Z86.16 HISTORY OF COVID-19: ICD-10-CM

## 2021-03-16 DIAGNOSIS — R05.3 CHRONIC COUGH: Primary | ICD-10-CM

## 2021-03-16 PROCEDURE — 99213 OFFICE O/P EST LOW 20 MIN: CPT | Performed by: FAMILY MEDICINE

## 2021-03-16 NOTE — PROGRESS NOTES
Video Visit Note    CC: cough    History of Present Illness  Emotionally drained from sister's death last week.    Chronic cough continues since before covid; has tried allergy treatments without success. Feels like clearing     Diagnosed with covid in December; short duration symptoms.     I have reviewed the patient's medical, family, and social history in detail and updated the computerized patient record.    Review of Systems    Physical Exam    General: No acute distress.  Eyes: conjunctiva clear; pupils equally round and reactive  Neck: Normal appearance, midline trachea, no visible masses  ENT: external ears and nose atraumatic; oropharynx clear; hearing appropriate  Resp: normal respiratory effort  Skin: No visible rashes  Psych: Alert and oriented to person place and time.  Mood and affect appropriate. Recent and remote memory intact.  Judgment and insight appropriate.      Diagnoses and all orders for this visit:    Chronic cough  -     Ambulatory Referral to ENT (Otolaryngology)    History of COVID-19      Overall the patient denies any typical reflux symptoms, this still sounds suspicious for laryngopharyngeal reflux.  Arrange ENT consult.  Arrange annual wellness visit with diabetes recheck.    This patient was evaluated by video due to current precautions with COVID-19. Consent to audio/video visit for this problem was given by the patient. Estimated time of visit duration: 16 minutes.

## 2021-04-07 RX ORDER — ATORVASTATIN CALCIUM 20 MG/1
20 TABLET, FILM COATED ORAL DAILY
Qty: 30 TABLET | Refills: 0 | Status: SHIPPED | OUTPATIENT
Start: 2021-04-07 | End: 2021-05-06 | Stop reason: SDUPTHER

## 2021-04-07 NOTE — TELEPHONE ENCOUNTER
Attempted to contact patient via phone with no answer. Left a detailed voicemail stating refill was approved x1 month and she is due for awv. Voiced to return call to office to get awv scheduled.     Thanks  Lian

## 2021-04-26 RX ORDER — ESCITALOPRAM OXALATE 10 MG/1
TABLET ORAL
Qty: 90 TABLET | Refills: 3 | Status: SHIPPED | OUTPATIENT
Start: 2021-04-26 | End: 2022-05-06

## 2021-04-26 RX ORDER — GLIPIZIDE 5 MG/1
TABLET ORAL
Qty: 180 TABLET | Refills: 3 | Status: SHIPPED | OUTPATIENT
Start: 2021-04-26 | End: 2022-03-21

## 2021-04-26 RX ORDER — LOSARTAN POTASSIUM 100 MG/1
TABLET ORAL
Qty: 90 TABLET | Refills: 3 | Status: SHIPPED | OUTPATIENT
Start: 2021-04-26 | End: 2022-04-27

## 2021-04-28 NOTE — TELEPHONE ENCOUNTER
Pt c/o back pain that radiates to the chest, headache, and abdominal pain. Pt endorses diarrhea. Pt denies vomiting.    Returned call to pt's . I explained clarification regarding use of medications for covid treatment or prevention. Unfortunately cannot offer any prophylactic treatment at this time but will continue to monitor treatment and prevention strategies. In the interim recommend strict social distancing, isolation as much as possible, etc.

## 2021-05-06 ENCOUNTER — OFFICE VISIT (OUTPATIENT)
Dept: SPORTS MEDICINE | Facility: CLINIC | Age: 76
End: 2021-05-06

## 2021-05-06 VITALS
RESPIRATION RATE: 16 BRPM | HEIGHT: 64 IN | HEART RATE: 100 BPM | TEMPERATURE: 97.7 F | SYSTOLIC BLOOD PRESSURE: 146 MMHG | BODY MASS INDEX: 26.73 KG/M2 | OXYGEN SATURATION: 94 % | DIASTOLIC BLOOD PRESSURE: 85 MMHG | WEIGHT: 156.6 LBS

## 2021-05-06 DIAGNOSIS — R05.3 CHRONIC COUGH: ICD-10-CM

## 2021-05-06 DIAGNOSIS — E11.40 TYPE 2 DIABETES MELLITUS WITH DIABETIC NEUROPATHY, WITHOUT LONG-TERM CURRENT USE OF INSULIN (HCC): ICD-10-CM

## 2021-05-06 DIAGNOSIS — Z00.00 MEDICARE ANNUAL WELLNESS VISIT, SUBSEQUENT: Primary | ICD-10-CM

## 2021-05-06 PROCEDURE — G0439 PPPS, SUBSEQ VISIT: HCPCS | Performed by: FAMILY MEDICINE

## 2021-05-06 PROCEDURE — 99214 OFFICE O/P EST MOD 30 MIN: CPT | Performed by: FAMILY MEDICINE

## 2021-05-06 RX ORDER — ATORVASTATIN CALCIUM 20 MG/1
20 TABLET, FILM COATED ORAL DAILY
Qty: 90 TABLET | Refills: 3 | Status: SHIPPED | OUTPATIENT
Start: 2021-05-06 | End: 2022-05-06

## 2021-05-24 DIAGNOSIS — E11.40 TYPE 2 DIABETES MELLITUS WITH DIABETIC NEUROPATHY, WITHOUT LONG-TERM CURRENT USE OF INSULIN (HCC): ICD-10-CM

## 2021-09-20 ENCOUNTER — OFFICE VISIT (OUTPATIENT)
Dept: SPORTS MEDICINE | Facility: CLINIC | Age: 76
End: 2021-09-20

## 2021-09-20 VITALS
HEIGHT: 64 IN | RESPIRATION RATE: 16 BRPM | HEART RATE: 92 BPM | SYSTOLIC BLOOD PRESSURE: 138 MMHG | BODY MASS INDEX: 26.63 KG/M2 | DIASTOLIC BLOOD PRESSURE: 78 MMHG | OXYGEN SATURATION: 97 % | WEIGHT: 156 LBS | TEMPERATURE: 98 F

## 2021-09-20 DIAGNOSIS — I10 ESSENTIAL HYPERTENSION: ICD-10-CM

## 2021-09-20 DIAGNOSIS — E11.40 TYPE 2 DIABETES MELLITUS WITH DIABETIC NEUROPATHY, WITHOUT LONG-TERM CURRENT USE OF INSULIN (HCC): ICD-10-CM

## 2021-09-20 DIAGNOSIS — R05.3 CHRONIC COUGH: ICD-10-CM

## 2021-09-20 DIAGNOSIS — R42 DIZZINESS: Primary | ICD-10-CM

## 2021-09-20 PROCEDURE — 99214 OFFICE O/P EST MOD 30 MIN: CPT | Performed by: FAMILY MEDICINE

## 2021-09-21 LAB
ALBUMIN SERPL-MCNC: 4.6 G/DL (ref 3.5–5.2)
ALBUMIN/GLOB SERPL: 2.3 G/DL
ALP SERPL-CCNC: 92 U/L (ref 39–117)
ALT SERPL-CCNC: 20 U/L (ref 1–33)
AST SERPL-CCNC: 23 U/L (ref 1–32)
BILIRUB SERPL-MCNC: 0.3 MG/DL (ref 0–1.2)
BUN SERPL-MCNC: 14 MG/DL (ref 8–23)
BUN/CREAT SERPL: 24.6 (ref 7–25)
CALCIUM SERPL-MCNC: 10 MG/DL (ref 8.6–10.5)
CHLORIDE SERPL-SCNC: 102 MMOL/L (ref 98–107)
CHOLEST SERPL-MCNC: 197 MG/DL (ref 0–200)
CHOLEST/HDLC SERPL: 4.02 {RATIO}
CO2 SERPL-SCNC: 29.2 MMOL/L (ref 22–29)
CREAT SERPL-MCNC: 0.57 MG/DL (ref 0.57–1)
GLOBULIN SER CALC-MCNC: 2 GM/DL
GLUCOSE SERPL-MCNC: 163 MG/DL (ref 65–99)
HBA1C MFR BLD: 7.2 % (ref 4.8–5.6)
HDLC SERPL-MCNC: 49 MG/DL (ref 40–60)
LDLC SERPL CALC-MCNC: 82 MG/DL (ref 0–100)
POTASSIUM SERPL-SCNC: 4.4 MMOL/L (ref 3.5–5.2)
PROT SERPL-MCNC: 6.6 G/DL (ref 6–8.5)
SODIUM SERPL-SCNC: 142 MMOL/L (ref 136–145)
T4 FREE SERPL-MCNC: 1.14 NG/DL (ref 0.93–1.7)
TRIGL SERPL-MCNC: 407 MG/DL (ref 0–150)
TSH SERPL DL<=0.005 MIU/L-ACNC: 1.65 UIU/ML (ref 0.27–4.2)
VLDLC SERPL CALC-MCNC: 66 MG/DL (ref 5–40)

## 2021-09-26 DIAGNOSIS — E11.40 TYPE 2 DIABETES MELLITUS WITH DIABETIC NEUROPATHY, WITHOUT LONG-TERM CURRENT USE OF INSULIN (HCC): ICD-10-CM

## 2021-10-25 ENCOUNTER — TREATMENT (OUTPATIENT)
Dept: PHYSICAL THERAPY | Facility: CLINIC | Age: 76
End: 2021-10-25

## 2021-10-25 DIAGNOSIS — H81.12 BPPV (BENIGN PAROXYSMAL POSITIONAL VERTIGO), LEFT: Primary | ICD-10-CM

## 2021-10-25 PROCEDURE — 95992 CANALITH REPOSITIONING PROC: CPT | Performed by: PHYSICAL THERAPIST

## 2021-10-25 PROCEDURE — 97161 PT EVAL LOW COMPLEX 20 MIN: CPT | Performed by: PHYSICAL THERAPIST

## 2021-10-25 NOTE — PROGRESS NOTES
Physical Therapy Initial Evaluation-  Vestibular Therapy    Patient Name: Cesilia Goel       Patient MRN: [id number]  : 1945  Physician:Suraj Waters MD  Date: 10/25/2021  Encounter Diagnoses   Name Primary?   • BPPV (benign paroxysmal positional vertigo), left Yes       Objective Testing:  Positional Testing  Positional Testing: With infrared goggles  Vertebrobasilar Artery Screen - Right: Negative  Vertebrobasilar Artery Screen - Left: Negative  Vero-Hallpike Right: Downbeat Nystagmus  [place]-Hallpike Left: Upbeat, left rotatory nystagmus  Vero-Hallpike Left Onset Time : 7 sec  Vero-Hallpike Left Duration Time : 8 sec  Horizontal Roll Test Right:  (NA)  Horizontal Roll Test Left:  (NA)             THERAPY ASSESSMENT: Patient is a 76 y.o. female. Patient presents to physical therapy due to complaints of 2 episodes of lightheadedness and syncope. She did not pass out and and has not had any symptoms in the past 4 weeks. Patient has MS and has been using a scooter for transportation outside of the home for 1 year. She uses a RW in the home. Her  reports 1 fall in the past several months and suspects worsening of her balance. Signs and symptoms are consistent with L PC canalithiasis BPPV. Treated with CRP today and tolerated well.  Patient is a good candidate for physical therapy to address the following:    Functional Limitations: Walking, Difficulty moving   Length of Therapy: 1 month   PT Frequency: PT 1x week   PT Interventions: Home Exercise Program, CRP   Patient Agrees with Plan of Care: Yes    REHAB POTENTIAL: excellent            SHORT TERM GOALS: To be met in 4 weeks:  1. Patient is independent with HEP.  2. Patient denies any symptoms of dizziness or vertigo.    Other Procedure CPT 15335 minutes 0    Timed Code Treatment: 0   Minutes     Total Treatment Time: 45      Minutes      PT SIGNATURE: Carol Ann Villarreal PT, DPT, CHT, PARISHN   [place] license #[id number]  DATE TREATMENT INITIATED: 10/25/2021      Medicare Initial Certification  Certification Period: 1/23/2022  I certify that the therapy services are furnished while this patient is under my care.  The services outlined above are required by this patient, and will be reviewed every 90 days.     PHYSICIAN: Suraj Waters MD      DATE:     Please sign and return via fax to [phone].. Thank you, [facility].

## 2021-11-01 ENCOUNTER — TREATMENT (OUTPATIENT)
Dept: PHYSICAL THERAPY | Facility: CLINIC | Age: 76
End: 2021-11-01

## 2021-11-01 DIAGNOSIS — H81.12 BPPV (BENIGN PAROXYSMAL POSITIONAL VERTIGO), LEFT: Primary | ICD-10-CM

## 2021-11-01 PROCEDURE — 95992 CANALITH REPOSITIONING PROC: CPT | Performed by: PHYSICAL THERAPIST

## 2021-11-01 NOTE — PROGRESS NOTES
Vestibular Daily Progress Note    Visit#:2  Subjective   I have not had any dizziness doing the exercise (3x). Have not noticed any changes in balance.   Objective            Positional Testing  Positional Testing: With infrared goggles  [place]-Hallpike Right: Downbeat Nystagmus  Vero-Hallpike Right Duration Time : 40 sec  [place]-Hallpike Left: Downbeat Nystagmus  [place]-Hallpike Left Duration Time : 40 sec  Horizontal Roll Test Right: Left-beating  Horizontal Roll Test Right Onset Time : immediate  Horizontal Roll Test Left: Right-beating  Horizontal Roll Test Left Onset Time : immediate  Treatment:  (deep head hang X2 for AC)             PROCEDURES AND MODALITIES:  Paraffin:    Moist Heat:    Ice:    E-Stim:    Ultrasound:    Ionto:   Traction:    See Exercise, Manual, and Modality Logs for complete treatment.   Other Procedure CPT 42443 minutes 0       Timed Code Treatment: 0 Minutes  Total Treatment Time: 30 Minutes    Assessment/Plan   L PC canalithiasis BPPV is resolved. She demostrates bilateral downbeating nystagmus that fatigues. This is likely a AC BPPV, side undetermined. She also tested positive for R LC cupulolithiasis BPPV. Will treat once AC is clear. This is likely affecting her balance. Should improve once BPPV is cleared.     Progress per Plan of Care    Carol Ann Villarreal, PT, DPT, CHT, CIDN  Physical Therapist

## 2021-11-08 ENCOUNTER — TREATMENT (OUTPATIENT)
Dept: PHYSICAL THERAPY | Facility: CLINIC | Age: 76
End: 2021-11-08

## 2021-11-08 DIAGNOSIS — H81.12 BPPV (BENIGN PAROXYSMAL POSITIONAL VERTIGO), LEFT: Primary | ICD-10-CM

## 2021-11-08 PROCEDURE — 95992 CANALITH REPOSITIONING PROC: CPT | Performed by: PHYSICAL THERAPIST

## 2021-11-08 NOTE — PROGRESS NOTES
Vestibular Daily Progress Note    Visit#:3  Subjective   Not having any dizziness and I think my balance is a little better.   Objective            Positional Testing  Positional Testing: With infrared goggles  Vero-Hallpike Right: Downbeat Nystagmus (subtle)  Vero-Hallpike Right Duration Time : 10 sec  [place]-Hallpike Left: Downbeat Nystagmus (subtle)  [place]-Hallpike Left Duration Time : 10 sec  Treatment:  (deep head hang x 2)             PROCEDURES AND MODALITIES:  Paraffin:    Moist Heat:    Ice:    E-Stim:    Ultrasound:    Ionto:   Traction:    See Exercise, Manual, and Modality Logs for complete treatment.   Other Procedure CPT 99527 minutes 0       Timed Code Treatment: 0 Minutes  Total Treatment Time: 30 Minutes    Assessment/Plan   Significantly less downbeating nystagmus observed in patient's DH tests today. Patient is reporting improvement in balance. This is somewhat difficult to  as patient has MS and onur use a scooter at times outside of the home and a walker in the home. Treated AC today again with CRP as it was beneficial last visit. Nearing resolution of symptoms.     Progress per Plan of Care    Carol Ann Villarreal, PT, DPT, CHT, CIDN  Physical Therapist

## 2021-11-15 ENCOUNTER — TREATMENT (OUTPATIENT)
Dept: PHYSICAL THERAPY | Facility: CLINIC | Age: 76
End: 2021-11-15

## 2021-11-15 DIAGNOSIS — H81.12 BPPV (BENIGN PAROXYSMAL POSITIONAL VERTIGO), LEFT: Primary | ICD-10-CM

## 2021-11-15 PROCEDURE — 95992 CANALITH REPOSITIONING PROC: CPT | Performed by: PHYSICAL THERAPIST

## 2021-11-15 NOTE — PROGRESS NOTES
Vestibular Daily Progress Note    Visit#:4  Subjective   Pt denies any episodes of dizziness or lightheadedness.  Objective            Positional Testing  Positional Testing: With infrared goggles  [place]-Hallpike Right: No nystagmus  [place]-Hallpike Left: No nystagmus  Horizontal Roll Test Right: No nystagmus  Horizontal Roll Test Left: No nystagmus  Treatment:  (none)             PROCEDURES AND MODALITIES:  Paraffin:    Moist Heat:    Ice:    E-Stim:    Ultrasound:    Ionto:   Traction:    See Exercise, Manual, and Modality Logs for complete treatment.   Other Procedure CPT 02667 minutes 0       Timed Code Treatment: 0 Minutes  Total Treatment Time: 30 Minutes    Assessment/Plan   BPPV seems to be resolved.. Will hold chart open for 30 days in case symptoms return.     Progress per Plan of Care    Carol Ann Villarreal PT, DPT, CHT, CIDN  Physical Therapist

## 2021-11-16 ENCOUNTER — APPOINTMENT (OUTPATIENT)
Dept: SLEEP MEDICINE | Facility: HOSPITAL | Age: 76
End: 2021-11-16

## 2021-12-07 ENCOUNTER — OFFICE VISIT (OUTPATIENT)
Dept: SLEEP MEDICINE | Facility: HOSPITAL | Age: 76
End: 2021-12-07

## 2021-12-07 VITALS
HEART RATE: 83 BPM | SYSTOLIC BLOOD PRESSURE: 134 MMHG | WEIGHT: 160 LBS | DIASTOLIC BLOOD PRESSURE: 69 MMHG | BODY MASS INDEX: 27.31 KG/M2 | OXYGEN SATURATION: 95 % | HEIGHT: 64 IN

## 2021-12-07 DIAGNOSIS — G47.33 OSA (OBSTRUCTIVE SLEEP APNEA): Primary | ICD-10-CM

## 2021-12-07 PROCEDURE — G0463 HOSPITAL OUTPT CLINIC VISIT: HCPCS

## 2021-12-07 NOTE — PROGRESS NOTES
"Cesilia Goel  : 1945  [id number]     DATE OF VISIT: 2021     HISTORY OF PRESENT ILLNESS: The patient returns for followup visit. 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 She received IV infusion with OCUVEVUS in 2017 and she is under the care of Dr. Tez Mckay at [facility].    She has severe obstructive sleep apnea syndrome and her polysomnography in the past has revealed apnea-hypopnea index of AHI 83.6 per sleep hour, minimum SpO2 of 79% and is on CPAP at 15 cm H20. She had hypersomnia with [place] Sleepiness Scale score of 9.     The patient has a [employer] dream station auto CPAP machine issued to her in 2019 and is under recall and she has registered her device with the  for replacement.  ~Waiting for the same.    Compliance data indicates excellent compliance with CPAP at 10.1 cm H20 with 96.7% usage with an average usage of 9 hours and 12 minutes and AHI down to 4.4 and [place] Sleepiness Scale score is down to 4.     ROS: Significant for nasal congestion, postnasal drip and cough and acid reflux.  PHYSICAL EXAMINATION:  Vitals:    21 1139   BP: 134/69   Pulse: 83   SpO2: 95%   Weight: 72.6 kg (160 lb)   Height: 162.6 cm (64\")   HEENT: Normal.   NECK: Supple. No bruits.   CARDIAC: Normal.   LUNGS: Clear to auscultation.   EXTREMITIES: No edema. She has left foot drop and spastic paraparesis.  Uses a power chair.    IMPRESSION: Patient with severe obstructive sleep apnea syndrome, AHI 83.6, minimum SpO2 of 79% and on CPAP at 15 cm H20, and excellent compliance. She is compliant and benefiting from it.      RECOMMENDATIONS: Continue present CPAP. Follow up in 1 year.     Mathew Hewitt M.D.  2021     "

## 2021-12-07 NOTE — PATIENT INSTRUCTIONS
The patient is [employer] dream station auto CPAP which is on recall by the  and I have given the instructions to the patient regarding the same to register his/her CPAP/BIPAP device on [employer] website and to contact the [employer] company.  The patient was also instructed not to use any form of ozone  such as so clean.    Phone number [phone] to call regarding registration of [employer] CPAP/BiPAP machine which is under recall.    Mathew Hewitt MD

## 2022-01-03 ENCOUNTER — TELEPHONE (OUTPATIENT)
Dept: SPORTS MEDICINE | Facility: CLINIC | Age: 77
End: 2022-01-03

## 2022-01-03 NOTE — TELEPHONE ENCOUNTER
Call to patient's  in regards to a My Chart message he sent.  Apparently he called last week, 12/31/21, to request an appointment with Dr Waters for the patient who had many COVID symptoms.   Dr Waters was out of the office.   The staff offered a telehealth visit or suggested the patient be taken to Urgent Care, where COVID testing is available as it is not in our office.   The patient's  elected to take her to a [facility]; where she did test positive for COVID and he did as well.       He was upset that we would not see her in the clinic.   I discussed with him the reasoning for that - we do not have the ability to test for COVID, and when patients exhibit several of the main symptoms of COVID, we offer telehealth for the safety of the patient and our other patients; as well as a convenience of not having them go to two separate locations.       He states that she is still not feeling very well and I offered her a telehealth visit to discuss with Dr Waters her symptoms and a general check on how she is doing.   He declined at this time due to her not being up yet and his prior medic work in the .   He feels he can monitor her symptoms and progress and let us know if she gets worse.  I did tell him that when she awakens, if she is still feeling poorly, we are happy to arrange a telehealth visit today with Dr Waters.   I also gave him instruction on how to contact the office directly when he calls.   If he does call back and wishes for a telehealth visit, please schedule with Dr Waters.

## 2022-03-21 RX ORDER — GLIPIZIDE 5 MG/1
TABLET ORAL
Qty: 180 TABLET | Refills: 0 | Status: SHIPPED | OUTPATIENT
Start: 2022-03-21 | End: 2022-08-09

## 2022-04-27 RX ORDER — LOSARTAN POTASSIUM 100 MG/1
100 TABLET ORAL DAILY
Qty: 30 TABLET | Refills: 1 | Status: SHIPPED | OUTPATIENT
Start: 2022-04-27 | End: 2022-06-09

## 2022-05-06 RX ORDER — ESCITALOPRAM OXALATE 10 MG/1
TABLET ORAL
Qty: 90 TABLET | Refills: 3 | Status: SHIPPED | OUTPATIENT
Start: 2022-05-06

## 2022-05-06 RX ORDER — ATORVASTATIN CALCIUM 20 MG/1
20 TABLET, FILM COATED ORAL DAILY
Qty: 90 TABLET | Refills: 3 | Status: SHIPPED | OUTPATIENT
Start: 2022-05-06

## 2022-05-17 ENCOUNTER — OFFICE VISIT (OUTPATIENT)
Dept: SPORTS MEDICINE | Facility: CLINIC | Age: 77
End: 2022-05-17

## 2022-05-17 VITALS
BODY MASS INDEX: 27.31 KG/M2 | HEIGHT: 64 IN | DIASTOLIC BLOOD PRESSURE: 78 MMHG | TEMPERATURE: 98.2 F | HEART RATE: 98 BPM | WEIGHT: 160 LBS | SYSTOLIC BLOOD PRESSURE: 122 MMHG | RESPIRATION RATE: 16 BRPM | OXYGEN SATURATION: 90 %

## 2022-05-17 DIAGNOSIS — Z00.00 MEDICARE ANNUAL WELLNESS VISIT, SUBSEQUENT: Primary | ICD-10-CM

## 2022-05-17 DIAGNOSIS — E78.2 MIXED HYPERLIPIDEMIA: ICD-10-CM

## 2022-05-17 DIAGNOSIS — E11.40 TYPE 2 DIABETES MELLITUS WITH DIABETIC NEUROPATHY, WITHOUT LONG-TERM CURRENT USE OF INSULIN: ICD-10-CM

## 2022-05-17 DIAGNOSIS — I10 PRIMARY HYPERTENSION: ICD-10-CM

## 2022-05-17 DIAGNOSIS — E55.9 VITAMIN D DEFICIENCY: ICD-10-CM

## 2022-05-17 DIAGNOSIS — R53.82 CHRONIC FATIGUE: ICD-10-CM

## 2022-05-17 PROBLEM — F17.200 CURRENT SMOKER: Status: ACTIVE | Noted: 2022-05-17

## 2022-05-17 PROCEDURE — 99214 OFFICE O/P EST MOD 30 MIN: CPT | Performed by: FAMILY MEDICINE

## 2022-05-17 PROCEDURE — 1170F FXNL STATUS ASSESSED: CPT | Performed by: FAMILY MEDICINE

## 2022-05-17 PROCEDURE — G0439 PPPS, SUBSEQ VISIT: HCPCS | Performed by: FAMILY MEDICINE

## 2022-05-17 PROCEDURE — 1159F MED LIST DOCD IN RCRD: CPT | Performed by: FAMILY MEDICINE

## 2022-05-17 RX ORDER — MELATONIN
2000 DAILY
COMMUNITY

## 2022-05-17 NOTE — PROGRESS NOTES
QUICK REFERENCE INFORMATION:  The ABCs of the Annual Wellness Visit    Subsequent Medicare Wellness Visit    HEALTH RISK ASSESSMENT    1945    Recent Hospitalizations:  No hospitalization(s) within the last year..        Current Medical Providers:  Patient Care Team:  Suraj Waters MD as PCP - General (Sports Medicine)        Smoking Status:  Social History     Tobacco Use   Smoking Status Current Every Day Smoker   Smokeless Tobacco Never Used       Alcohol Consumption:  Social History     Substance and Sexual Activity   Alcohol Use No       Depression Screen:   PHQ-2/PHQ-9 Depression Screening 5/17/2022   Retired PHQ-9 Total Score -   Retired Total Score -   Little Interest or Pleasure in Doing Things 0-->not at all   Feeling Down, Depressed or Hopeless 0-->not at all   PHQ-9: Brief Depression Severity Measure Score 0       Health Habits and Functional and Cognitive Screening:  Functional & Cognitive Status 5/17/2022   Do you have difficulty preparing food and eating? No   Do you have difficulty bathing yourself, getting dressed or grooming yourself? No   Do you have difficulty using the toilet? No   Do you have difficulty moving around from place to place? Yes   Do you have trouble with steps or getting out of a bed or a chair? No   Current Diet Well Balanced Diet   Dental Exam Up to date   Eye Exam Up to date   Exercise (times per week) 7 times per week   Current Exercises Include Bicycling Outdoors   Current Exercise Activities Include -   Do you need help using the phone?  No   Are you deaf or do you have serious difficulty hearing?  No   Do you need help with transportation? Yes   Do you need help shopping? No   Do you need help preparing meals?  No   Do you need help with housework?  No   Do you need help with laundry? No   Do you need help taking your medications? No   Do you need help managing money? No   Do you ever drive or ride in a car without wearing a seat belt? No   Have you felt unusual  stress, anger or loneliness in the last month? -   Who do you live with? -   If you need help, do you have trouble finding someone available to you? -   Have you been bothered in the last four weeks by sexual problems? -   Do you have difficulty concentrating, remembering or making decisions? -           Does the patient have evidence of cognitive impairment? Yes - diagnosed with NP testing, followed by neuro    Aspirin use counseling: Start ASA 81 mg daily       Recent Lab Results:  CMP:  Lab Results   Component Value Date     (H) 03/11/2021    BUN 14 09/20/2021    CREATININE 0.57 09/20/2021    EGFRIFNONA 103 09/20/2021    EGFRIFAFRI 125 09/20/2021    BCR 24.6 09/20/2021     09/20/2021    K 4.4 09/20/2021    CO2 29.2 (H) 09/20/2021    CALCIUM 10.0 09/20/2021    PROTENTOTREF 6.6 09/20/2021    ALBUMIN 4.60 09/20/2021    LABGLOBREF 2.0 09/20/2021    LABIL2 2.3 09/20/2021    BILITOT 0.3 09/20/2021    ALKPHOS 92 09/20/2021    AST 23 09/20/2021    ALT 20 09/20/2021     Lipid Panel:  Lab Results   Component Value Date    TRIG 407 (H) 09/20/2021    HDL 49 09/20/2021    VLDL 66 (H) 09/20/2021     HbA1c:  Lab Results   Component Value Date    HGBA1C 7.0 (H) 03/24/2022       Visual Acuity:  No exam data present    Age-appropriate Screening Schedule:  Refer to the list below for future screening recommendations based on patient's age, sex and/or medical conditions. Orders for these recommended tests are listed in the plan section. The patient has been provided with a written plan.    Health Maintenance   Topic Date Due   • DXA SCAN  Never done   • ZOSTER VACCINE (2 of 2) 09/14/2017   • DIABETIC FOOT EXAM  07/20/2018   • MAMMOGRAM  07/24/2020   • URINE MICROALBUMIN  09/30/2021   • INFLUENZA VACCINE  08/01/2022   • DIABETIC EYE EXAM  08/04/2022   • LIPID PANEL  09/20/2022   • HEMOGLOBIN A1C  09/24/2022   • TDAP/TD VACCINES (2 - Td or Tdap) 07/20/2027        Subjective   History of Present Illness    Cesilia ROBERTS  Amando is a 76 y.o. female who presents for an Subsequent Wellness Visit.    The following portions of the patient's history were reviewed and updated as appropriate: allergies, current medications, past family history, past medical history, past social history, past surgical history and problem list.    Outpatient Medications Prior to Visit   Medication Sig Dispense Refill   • atorvastatin (LIPITOR) 20 MG tablet TAKE 1 TABLET BY MOUTH DAILY 90 tablet 3   • Calcium Carbonate-Vitamin D 500-125 MG-UNIT tablet Take  by mouth.     • escitalopram (LEXAPRO) 10 MG tablet TAKE 1 TABLET BY MOUTH DAILY 90 tablet 3   • glipizide (GLUCOTROL) 5 MG tablet TAKE 1 TABLET BY MOUTH TWICE DAILY BEFORE MEALS 180 tablet 0   • losartan (COZAAR) 100 MG tablet Take 1 tablet by mouth Daily. DUE FOR ANNUAL PHYSICAL 05/2022 - PLEASE CONTACT OFFICE TO SCHEDULE 30 tablet 1   • metFORMIN (GLUCOPHAGE) 1000 MG tablet TAKE 1 TABLET BY MOUTH TWICE DAILY AFTER A MEAL 60 tablet 11   • Multiple Vitamin (MULTIVITAMIN) tablet Take 1 tablet by mouth.     • MYRBETRIQ 50 MG tablet sustained-release 24 hour 24 hr tablet Take  by mouth Daily.  3   • sodium chloride 0.9 % solution 250 mL with Ocrelizumab 300 MG/10ML solution Infuse  into a venous catheter.     • ascorbic acid (VITAMIN C) 500 MG capsule controlled-release CR capsule Take  by mouth.     • cholecalciferol (Cholecalciferol) 25 MCG (1000 UT) tablet Take 2,000 Units by mouth Daily.     • cyanocobalamin (VITAMIN B-12) 1000 MCG tablet Take 1,000 mcg by mouth Daily.       No facility-administered medications prior to visit.       Patient Active Problem List   Diagnosis   • Type 2 diabetes mellitus with diabetic neuropathy, without long-term current use of insulin (HCC)   • Allergic rhinitis   • Mild single current episode of major depressive disorder (HCC)   • Dermatitis, eczematoid   • HLD (hyperlipidemia)   • Hypertension   • Multiple sclerosis, primary progressive (HCC)   • Absence of bladder  "continence   • LOUIE (obstructive sleep apnea)   • Hypersomnia   • Current smoker       Advance Care Planning:  ACP discussion was held with the patient during this visit. Patient has an advance directive in EMR which is still valid.     Identification of Risk Factors:  Risk factors include: Dementia/Memory   Depression/Dysphoria  Diabetic Lab Screening   Tobacco Use/Dependance (use dotphrase .tobaccocessation for documentation).    Review of Systems    Compared to one year ago, the patient feels her physical health is the same.  Compared to one year ago, the patient feels her mental health is the same.    Objective     Physical Exam  Vitals reviewed.   Eyes:      Pupils: Pupils are equal, round, and reactive to light.   Cardiovascular:      Rate and Rhythm: Normal rate and regular rhythm.      Pulses: Normal pulses.      Heart sounds: Normal heart sounds.   Pulmonary:      Effort: Pulmonary effort is normal.      Breath sounds: Normal breath sounds.   Neurological:      General: No focal deficit present.      Mental Status: She is alert.      Gait: Gait abnormal.   Psychiatric:         Mood and Affect: Mood normal.         Behavior: Behavior normal.         Vitals:    05/17/22 0847   BP: 122/78   BP Location: Left arm   Patient Position: Sitting   Cuff Size: Adult   Pulse: 98   Resp: 16   Temp: 98.2 °F (36.8 °C)   SpO2: 90%   Weight: 72.6 kg (160 lb)   Height: 162.6 cm (64\")       BMI is >= 25 and < 30. (Overweight) The following options were offered after discussion: exercise counseling/recommendations and nutrition counseling/recommendations      Assessment & Plan   Patient Self-Management and Personalized Health Advice  The patient has been provided with information about: diet, exercise, tobacco cessation, fall prevention and mental health concerns and preventive services including:   · Bone Density Measurements  · Depression Screening (15 minutes face to face, Code )  · Diabetes Screening-Lab Order for " either glucose quantitative blood (except reagent strip), glucose;post glucose dose(includes glucose), or glucose tolerance test-3 specimens(includes glucose).    Visit Diagnoses:    ICD-10-CM ICD-9-CM   1. Medicare annual wellness visit, subsequent  Z00.00 V70.0   2. Chronic fatigue  R53.82 780.79   3. Type 2 diabetes mellitus with diabetic neuropathy, without long-term current use of insulin (HCC)  E11.40 250.60     357.2   4. Primary hypertension  I10 401.9   5. Mixed hyperlipidemia  E78.2 272.2   6. Vitamin D deficiency  E55.9 268.9       Orders Placed This Encounter   Procedures   • Hemoglobin A1c     Order Specific Question:   Release to patient     Answer:   Immediate   • Microalbumin / Creatinine Urine Ratio - Urine, Clean Catch     Order Specific Question:   Release to patient     Answer:   Immediate   • Comprehensive Metabolic Panel     Order Specific Question:   Release to patient     Answer:   Immediate   • Lipid Panel With / Chol / HDL Ratio     Order Specific Question:   Release to patient     Answer:   Immediate   • TSH     Order Specific Question:   Release to patient     Answer:   Immediate   • T4, Free     Order Specific Question:   Release to patient     Answer:   Immediate   • Vitamin B12     Order Specific Question:   Release to patient     Answer:   Immediate   • Vitamin D 25 Hydroxy     Order Specific Question:   Release to patient     Answer:   Immediate   • CBC & Differential     Order Specific Question:   Manual Differential     Answer:   No       Outpatient Encounter Medications as of 5/17/2022   Medication Sig Dispense Refill   • atorvastatin (LIPITOR) 20 MG tablet TAKE 1 TABLET BY MOUTH DAILY 90 tablet 3   • Calcium Carbonate-Vitamin D 500-125 MG-UNIT tablet Take  by mouth.     • escitalopram (LEXAPRO) 10 MG tablet TAKE 1 TABLET BY MOUTH DAILY 90 tablet 3   • glipizide (GLUCOTROL) 5 MG tablet TAKE 1 TABLET BY MOUTH TWICE DAILY BEFORE MEALS 180 tablet 0   • losartan (COZAAR) 100 MG tablet  Take 1 tablet by mouth Daily. DUE FOR ANNUAL PHYSICAL 2022 - PLEASE CONTACT OFFICE TO SCHEDULE 30 tablet 1   • metFORMIN (GLUCOPHAGE) 1000 MG tablet TAKE 1 TABLET BY MOUTH TWICE DAILY AFTER A MEAL 60 tablet 11   • Multiple Vitamin (MULTIVITAMIN) tablet Take 1 tablet by mouth.     • MYRBETRIQ 50 MG tablet sustained-release 24 hour 24 hr tablet Take  by mouth Daily.  3   • sodium chloride 0.9 % solution 250 mL with Ocrelizumab 300 MG/10ML solution Infuse  into a venous catheter.     • ascorbic acid (VITAMIN C) 500 MG capsule controlled-release CR capsule Take  by mouth.     • cholecalciferol (Cholecalciferol) 25 MCG (1000 UT) tablet Take 2,000 Units by mouth Daily.     • cyanocobalamin (VITAMIN B-12) 1000 MCG tablet Take 1,000 mcg by mouth Daily.       No facility-administered encounter medications on file as of 2022.       Reviewed use of high risk medication in the elderly: yes  Reviewed for potential of harmful drug interactions in the elderly: yes    Follow Up:  No follow-ups on file.     An After Visit Summary and PPPS with all of these plans were given to the patient.           CC:  In addition to AWV also follow up items    HPI:  - notes more fatigue recently. Does not seem to correlate to MS or infusions. Notes not sleeping well recently either - trouble going to sleep. Lots of concerns about friends and family; many friends have   -Breathing feeling well. Pulmonology agreed chronic bronchitis  -HTN stable  -DM stable, no low episodes    A/P:  Labs as above  Continue care of dm/htn/hld  Discussed sleep hygiene, daily rhythms, counseling  Conitnue neuro care

## 2022-05-18 LAB
25(OH)D3+25(OH)D2 SERPL-MCNC: 35.3 NG/ML (ref 30–100)
ALBUMIN SERPL-MCNC: 4.6 G/DL (ref 3.7–4.7)
ALBUMIN/CREAT UR: 11 MG/G CREAT (ref 0–29)
ALBUMIN/GLOB SERPL: 1.7 {RATIO} (ref 1.2–2.2)
ALP SERPL-CCNC: 105 IU/L (ref 44–121)
ALT SERPL-CCNC: 25 IU/L (ref 0–32)
AST SERPL-CCNC: 26 IU/L (ref 0–40)
BASOPHILS # BLD AUTO: 0.1 X10E3/UL (ref 0–0.2)
BASOPHILS NFR BLD AUTO: 1 %
BILIRUB SERPL-MCNC: 0.5 MG/DL (ref 0–1.2)
BUN SERPL-MCNC: 21 MG/DL (ref 8–27)
BUN/CREAT SERPL: 23 (ref 12–28)
CALCIUM SERPL-MCNC: 9.5 MG/DL (ref 8.7–10.3)
CHLORIDE SERPL-SCNC: 102 MMOL/L (ref 96–106)
CHOLEST SERPL-MCNC: 174 MG/DL (ref 100–199)
CHOLEST/HDLC SERPL: 2.9 RATIO (ref 0–4.4)
CO2 SERPL-SCNC: 24 MMOL/L (ref 20–29)
CREAT SERPL-MCNC: 0.92 MG/DL (ref 0.57–1)
CREAT UR-MCNC: 73 MG/DL
EGFRCR SERPLBLD CKD-EPI 2021: 65 ML/MIN/1.73
EOSINOPHIL # BLD AUTO: 0.4 X10E3/UL (ref 0–0.4)
EOSINOPHIL NFR BLD AUTO: 4 %
ERYTHROCYTE [DISTWIDTH] IN BLOOD BY AUTOMATED COUNT: 14.3 % (ref 11.7–15.4)
GLOBULIN SER CALC-MCNC: 2.7 G/DL (ref 1.5–4.5)
GLUCOSE SERPL-MCNC: 86 MG/DL (ref 65–99)
HBA1C MFR BLD: 7.1 % (ref 4.8–5.6)
HCT VFR BLD AUTO: 39.5 % (ref 34–46.6)
HDLC SERPL-MCNC: 61 MG/DL
HGB BLD-MCNC: 13.1 G/DL (ref 11.1–15.9)
IMM GRANULOCYTES # BLD AUTO: 0 X10E3/UL (ref 0–0.1)
IMM GRANULOCYTES NFR BLD AUTO: 0 %
LDLC SERPL CALC-MCNC: 100 MG/DL (ref 0–99)
LYMPHOCYTES # BLD AUTO: 2.5 X10E3/UL (ref 0.7–3.1)
LYMPHOCYTES NFR BLD AUTO: 23 %
MCH RBC QN AUTO: 28.2 PG (ref 26.6–33)
MCHC RBC AUTO-ENTMCNC: 33.2 G/DL (ref 31.5–35.7)
MCV RBC AUTO: 85 FL (ref 79–97)
MICROALBUMIN UR-MCNC: 7.7 UG/ML
MONOCYTES # BLD AUTO: 0.8 X10E3/UL (ref 0.1–0.9)
MONOCYTES NFR BLD AUTO: 8 %
NEUTROPHILS # BLD AUTO: 6.8 X10E3/UL (ref 1.4–7)
NEUTROPHILS NFR BLD AUTO: 64 %
PLATELET # BLD AUTO: 299 X10E3/UL (ref 150–450)
POTASSIUM SERPL-SCNC: 4.4 MMOL/L (ref 3.5–5.2)
PROT SERPL-MCNC: 7.3 G/DL (ref 6–8.5)
RBC # BLD AUTO: 4.64 X10E6/UL (ref 3.77–5.28)
SODIUM SERPL-SCNC: 140 MMOL/L (ref 134–144)
T4 FREE SERPL-MCNC: 1.19 NG/DL (ref 0.82–1.77)
TRIGL SERPL-MCNC: 67 MG/DL (ref 0–149)
TSH SERPL DL<=0.005 MIU/L-ACNC: 2.23 UIU/ML (ref 0.45–4.5)
VIT B12 SERPL-MCNC: 320 PG/ML (ref 232–1245)
VLDLC SERPL CALC-MCNC: 13 MG/DL (ref 5–40)
WBC # BLD AUTO: 10.6 X10E3/UL (ref 3.4–10.8)

## 2022-06-09 RX ORDER — LOSARTAN POTASSIUM 100 MG/1
TABLET ORAL
Qty: 90 TABLET | Refills: 3 | Status: SHIPPED | OUTPATIENT
Start: 2022-06-09

## 2022-08-09 RX ORDER — GLIPIZIDE 5 MG/1
TABLET ORAL
Qty: 180 TABLET | Refills: 0 | Status: SHIPPED | OUTPATIENT
Start: 2022-08-09 | End: 2022-11-11

## 2022-08-22 ENCOUNTER — OFFICE VISIT (OUTPATIENT)
Dept: SPORTS MEDICINE | Facility: CLINIC | Age: 77
End: 2022-08-22

## 2022-08-22 VITALS
HEIGHT: 64 IN | DIASTOLIC BLOOD PRESSURE: 70 MMHG | WEIGHT: 160 LBS | BODY MASS INDEX: 27.31 KG/M2 | SYSTOLIC BLOOD PRESSURE: 142 MMHG | HEART RATE: 90 BPM | OXYGEN SATURATION: 95 % | TEMPERATURE: 98.3 F

## 2022-08-22 DIAGNOSIS — J18.9 COMMUNITY ACQUIRED PNEUMONIA OF RIGHT UPPER LOBE OF LUNG: Primary | ICD-10-CM

## 2022-08-22 PROCEDURE — 99213 OFFICE O/P EST LOW 20 MIN: CPT | Performed by: FAMILY MEDICINE

## 2022-08-22 NOTE — PROGRESS NOTES
"Cesilia is a 76 y.o. year old female    Chief Complaint   Patient presents with   • Pneumonia     Diagnosed Sunday with pneumonia at [facility]; was told to follow up with PCP       History of Present Illness   HPI   F/u cough - diagnosed with pneumonia RUL and L midlung by cxr 8/14 at immediate care. Feeling better, just finished augmentin and azithromycin.  Still coughing but less and no more fever.    Review of Systems    /70 (BP Location: Left arm, Patient Position: Sitting, Cuff Size: Adult)   Pulse 90   Temp 98.3 °F (36.8 °C) (Temporal)   Ht 162.6 cm (64.02\")   Wt 72.6 kg (160 lb) Comment: per pt;  SpO2 95%   BMI 27.45 kg/m²          Physical Exam  Vitals and nursing note reviewed.   Constitutional:       General: She is not in acute distress.     Appearance: She is not ill-appearing, toxic-appearing or diaphoretic.   Cardiovascular:      Rate and Rhythm: Normal rate and regular rhythm.      Pulses: Normal pulses.      Heart sounds: Normal heart sounds.   Pulmonary:      Breath sounds: No wheezing or rales.      Comments: There are coarse rhonchi in the right upper lobe.  Psychiatric:         Mood and Affect: Mood normal.           Current Outpatient Medications:   •  ascorbic acid (VITAMIN C) 500 MG capsule controlled-release CR capsule, Take  by mouth., Disp: , Rfl:   •  atorvastatin (LIPITOR) 20 MG tablet, TAKE 1 TABLET BY MOUTH DAILY, Disp: 90 tablet, Rfl: 3  •  Calcium Carbonate-Vitamin D 500-125 MG-UNIT tablet, Take  by mouth., Disp: , Rfl:   •  cholecalciferol (VITAMIN D3) 25 MCG (1000 UT) tablet, Take 2,000 Units by mouth Daily., Disp: , Rfl:   •  cyanocobalamin (VITAMIN B-12) 1000 MCG tablet, Take 1,000 mcg by mouth Daily., Disp: , Rfl:   •  escitalopram (LEXAPRO) 10 MG tablet, TAKE 1 TABLET BY MOUTH DAILY, Disp: 90 tablet, Rfl: 3  •  glipizide (GLUCOTROL) 5 MG tablet, TAKE 1 TABLET BY MOUTH TWICE DAILY BEFORE MEALS, Disp: 180 tablet, Rfl: 0  •  losartan (COZAAR) 100 MG tablet, TAKE 1 TABLET BY " MOUTH DAILY, Disp: 90 tablet, Rfl: 3  •  metFORMIN (GLUCOPHAGE) 1000 MG tablet, TAKE 1 TABLET BY MOUTH TWICE DAILY AFTER A MEAL, Disp: 60 tablet, Rfl: 11  •  Multiple Vitamin (MULTIVITAMIN) tablet, Take 1 tablet by mouth., Disp: , Rfl:   •  MYRBETRIQ 50 MG tablet sustained-release 24 hour 24 hr tablet, Take  by mouth Daily., Disp: , Rfl: 3  •  sodium chloride 0.9 % solution 250 mL with Ocrelizumab 300 MG/10ML solution, Infuse  into a venous catheter., Disp: , Rfl:      Chest X-Ray  Indication: Follow-up pneumonia  Views: PA and Lateral    Findings:  Normal lung fields.  No infiltrate specifically in the right upper or the left midlung.  No evidence of pneumothorax or effusion.  No visible fractures.    There were no previous studies available for comparison.     Diagnoses and all orders for this visit:    Community acquired pneumonia of right upper lobe of lung  -     XR Chest PA & Lateral    Recovering well.  Discussed expectations, expect cough to continue to subside over the next couple of weeks.

## 2022-09-30 DIAGNOSIS — E11.40 TYPE 2 DIABETES MELLITUS WITH DIABETIC NEUROPATHY, WITHOUT LONG-TERM CURRENT USE OF INSULIN: ICD-10-CM

## 2022-11-11 RX ORDER — GLIPIZIDE 5 MG/1
TABLET ORAL
Qty: 180 TABLET | Refills: 0 | Status: SHIPPED | OUTPATIENT
Start: 2022-11-11 | End: 2023-02-10

## 2022-11-14 ENCOUNTER — OFFICE VISIT (OUTPATIENT)
Dept: SLEEP MEDICINE | Facility: HOSPITAL | Age: 77
End: 2022-11-14

## 2022-11-14 VITALS
HEIGHT: 64 IN | DIASTOLIC BLOOD PRESSURE: 69 MMHG | HEART RATE: 86 BPM | BODY MASS INDEX: 27.66 KG/M2 | OXYGEN SATURATION: 94 % | SYSTOLIC BLOOD PRESSURE: 148 MMHG | WEIGHT: 162 LBS

## 2022-11-14 DIAGNOSIS — G35 MULTIPLE SCLEROSIS, PRIMARY PROGRESSIVE: ICD-10-CM

## 2022-11-14 DIAGNOSIS — G47.33 OSA (OBSTRUCTIVE SLEEP APNEA): Primary | ICD-10-CM

## 2022-11-14 PROCEDURE — G0463 HOSPITAL OUTPT CLINIC VISIT: HCPCS

## 2022-11-14 NOTE — PROGRESS NOTES
[facility]  Telephone: [phone] / Fax: [phone] [place]  Telephone: [phone] / Fax: [phone] Massiel Amador    PCP: Suraj Waters MD    Reason for visit: LOUIE f/u    Cesilia Goel is a 77 y.o.female  was last seen at [facility] sleep lab in 2021. She is a patient of Dr Hewitt who is new to me. She uses nasal cushion mask that fits well. She has only occasional nocturnal awakening due to mouth. Likely mouth breather- per . She never tried using a different mask. Dry mouth is not debilitating.  /pt prefer to stay on current mask and not do mask fitting at this time.. She received a new device through [employer]. Has not yet started using it. Her DME [employer] [facility].     SH smokes cig, no alcohol, no caffeine    ROS +post nasal drip, +nasal congestion + cough.      Current Medications:    Current Outpatient Medications:   •  ascorbic acid (VITAMIN C) 500 MG capsule controlled-release CR capsule, Take  by mouth., Disp: , Rfl:   •  atorvastatin (LIPITOR) 20 MG tablet, TAKE 1 TABLET BY MOUTH DAILY, Disp: 90 tablet, Rfl: 3  •  Calcium Carbonate-Vitamin D 500-125 MG-UNIT tablet, Take  by mouth., Disp: , Rfl:   •  cholecalciferol (VITAMIN D3) 25 MCG (1000 UT) tablet, Take 2,000 Units by mouth Daily., Disp: , Rfl:   •  cyanocobalamin (VITAMIN B-12) 1000 MCG tablet, Take 1,000 mcg by mouth Daily., Disp: , Rfl:   •  escitalopram (LEXAPRO) 10 MG tablet, TAKE 1 TABLET BY MOUTH DAILY, Disp: 90 tablet, Rfl: 3  •  glipizide (GLUCOTROL) 5 MG tablet, TAKE 1 TABLET BY MOUTH TWICE DAILY BEFORE MEALS, Disp: 180 tablet, Rfl: 0  •  losartan (COZAAR) 100 MG tablet, TAKE 1 TABLET BY MOUTH DAILY, Disp: 90 tablet, Rfl: 3  •  metFORMIN (GLUCOPHAGE) 1000 MG tablet, TAKE 1 TABLET BY MOUTH TWICE DAILY AFTER A MEAL, Disp: 60 tablet, Rfl: 11  •  Multiple Vitamin (MULTIVITAMIN) tablet, Take 1 tablet by mouth., Disp: , Rfl:   •  MYRBETRIQ 50 MG tablet sustained-release 24 hour 24 hr tablet, Take   "by mouth Daily., Disp: , Rfl: 3  •  sodium chloride 0.9 % solution 250 mL with Ocrelizumab 300 MG/10ML solution, Infuse  into a venous catheter., Disp: , Rfl:    also entered in Sleep Questionnaire    Patient  has a past medical history of Diabetes mellitus (HCC), Hyperlipidemia, and Hypertension.    I have reviewed the Past Medical History, Past Surgical History, Social History and Family History.    Vital Signs /69   Pulse 86   Ht 162.6 cm (64.02\")   Wt 73.5 kg (162 lb)   SpO2 94%   BMI 27.79 kg/m²  Body mass index is 27.79 kg/m².    General Alert and oriented. No acute distress noted   Pharynx/Throat Class IIII   Mallampati airway, large tongue, no evidence of redundant lateral pharyngeal tissue. No oral lesions. No thrush. Moist mucous membranes.   Head Normocephalic. Symmetrical. Atraumatic.    Nose No septal deviation. No drainage   Chest Wall Normal shape. Symmetric expansion with respiration. No tenderness.   Neck Trachea midline, no thyromegaly or adenopathy    Lungs Clear to auscultation bilaterally. No wheezes. No rhonchi. No rales. Respirations regular, even and unlabored.   Heart Regular rhythm and normal rate. Normal S1 and S2. No murmur   Abdomen Soft, non-tender and non-distended. Normal bowel sounds. No masses.   Extremities Moves all extremities well. No edema   Psychiatric Normal mood and affect.     Testing:  · Download 10/15/22-11/13/22 96% use with avg nightly use of 8 hours and 51 minutes on auto CPAP 5-20cm H2O, avg pr 14.5cm H2O, AHI 5.5, leak of 1 min and 46 seconds.    Study- severe obstructive sleep apnea syndrome and her polysomnography in the past has revealed apnea-hypopnea index of AHI 83.6 per sleep hour, minimum SpO2 of 79%    Impression:  1. LOUIE (obstructive sleep apnea)    2. Multiple sclerosis, primary progressive (HCC)          Plan:  Use CPAP and benefits. I reviewed prior record from Dr Hewitt as well as recent download report from old [employer] machine. She " received new Justin device from the recall center. I showed her how to detach the humidifier and add water to it, where filters are located and where the smart card goes. She will continue using the same mask.     Note pt has MS. She is under the care of neurologist.     Follow up with Dr. Ely in one year    Thank you for allowing me to participate in your patient's care.      LAVERNE Perez  [facility]  Phone: [phone]      Part of this note may be an electronic transcription/translation of spoken language to printed text using the Dragon Dictation System.

## 2023-02-10 RX ORDER — GLIPIZIDE 5 MG/1
TABLET ORAL
Qty: 180 TABLET | Refills: 0 | Status: SHIPPED | OUTPATIENT
Start: 2023-02-10

## 2023-03-15 ENCOUNTER — TELEPHONE (OUTPATIENT)
Dept: SPORTS MEDICINE | Facility: CLINIC | Age: 78
End: 2023-03-15
Payer: MEDICARE

## 2023-03-15 DIAGNOSIS — U07.1 COVID-19 VIRUS INFECTION: Primary | ICD-10-CM

## 2023-03-15 NOTE — TELEPHONE ENCOUNTER
Patient's  called and stated that his wife has COVID and MS and would like for her to be prescribed Paxlovid for her symptoms so that he doesn't have to take her top immediate care center. He also wanted it noted that she is not on Ocrelizumab anymore since her Neurologist took her off it. I told him that I would send a note to Dr. Waters and Dr. Florian to see about getting his wife a prescription to help with her COVID symptoms and that I would give him a call back once I got an answer.     -ETIENNE Alicea

## 2023-03-15 NOTE — TELEPHONE ENCOUNTER
called back about possible medication for patient. Says patient has mobility issues and doesn't want to have to take her to ER ([facility] since MS diagnosis makes her high risk). Would like prescriptions for Paxlovid called in. Please advise.     Thanks,  Kady

## 2023-03-15 NOTE — TELEPHONE ENCOUNTER
Symptoms began 3/14/23 with known covid contact (). C/o headache, ear pain, congestion, fever up to 101.5.   Discussed options, agreed on paxlovid.   Discussed statin interaction and hold statin until 7 days after completing course.

## 2023-05-15 RX ORDER — ESCITALOPRAM OXALATE 10 MG/1
TABLET ORAL
Qty: 90 TABLET | Refills: 3 | Status: SHIPPED | OUTPATIENT
Start: 2023-05-15

## 2023-05-19 ENCOUNTER — OFFICE VISIT (OUTPATIENT)
Dept: SPORTS MEDICINE | Facility: CLINIC | Age: 78
End: 2023-05-19
Payer: MEDICARE

## 2023-05-19 ENCOUNTER — LAB (OUTPATIENT)
Dept: LAB | Facility: HOSPITAL | Age: 78
End: 2023-05-19
Payer: MEDICARE

## 2023-05-19 VITALS
HEART RATE: 102 BPM | BODY MASS INDEX: 27.31 KG/M2 | SYSTOLIC BLOOD PRESSURE: 126 MMHG | HEIGHT: 64 IN | OXYGEN SATURATION: 96 % | DIASTOLIC BLOOD PRESSURE: 66 MMHG | TEMPERATURE: 98 F | WEIGHT: 160 LBS

## 2023-05-19 DIAGNOSIS — Z00.00 MEDICARE ANNUAL WELLNESS VISIT, SUBSEQUENT: Primary | ICD-10-CM

## 2023-05-19 DIAGNOSIS — E11.40 TYPE 2 DIABETES MELLITUS WITH DIABETIC NEUROPATHY, WITHOUT LONG-TERM CURRENT USE OF INSULIN: ICD-10-CM

## 2023-05-19 DIAGNOSIS — F32.0 MILD SINGLE CURRENT EPISODE OF MAJOR DEPRESSIVE DISORDER: ICD-10-CM

## 2023-05-19 DIAGNOSIS — E55.9 VITAMIN D DEFICIENCY: ICD-10-CM

## 2023-05-19 DIAGNOSIS — Z23 NEED FOR PNEUMOCOCCAL VACCINATION: ICD-10-CM

## 2023-05-19 DIAGNOSIS — E53.8 B12 DEFICIENCY: ICD-10-CM

## 2023-05-19 DIAGNOSIS — J41.0 SIMPLE CHRONIC BRONCHITIS: ICD-10-CM

## 2023-05-19 DIAGNOSIS — E78.2 MIXED HYPERLIPIDEMIA: ICD-10-CM

## 2023-05-19 DIAGNOSIS — I10 PRIMARY HYPERTENSION: ICD-10-CM

## 2023-05-19 LAB
25(OH)D3 SERPL-MCNC: 54.3 NG/ML (ref 30–100)
ALBUMIN SERPL-MCNC: 3.9 G/DL (ref 3.5–5.2)
ALBUMIN UR-MCNC: <1.2 MG/DL
ALBUMIN/GLOB SERPL: 1.3 G/DL
ALP SERPL-CCNC: 89 U/L (ref 39–117)
ALT SERPL W P-5'-P-CCNC: 13 U/L (ref 1–33)
ANION GAP SERPL CALCULATED.3IONS-SCNC: 10.6 MMOL/L (ref 5–15)
AST SERPL-CCNC: 14 U/L (ref 1–32)
BACTERIA UR QL AUTO: ABNORMAL /HPF
BASOPHILS # BLD AUTO: 0.06 10*3/MM3 (ref 0–0.2)
BASOPHILS NFR BLD AUTO: 0.5 % (ref 0–1.5)
BILIRUB SERPL-MCNC: 0.2 MG/DL (ref 0–1.2)
BILIRUB UR QL STRIP: NEGATIVE
BUN SERPL-MCNC: 18 MG/DL (ref 8–23)
BUN/CREAT SERPL: 32.1 (ref 7–25)
CALCIUM SPEC-SCNC: 9.5 MG/DL (ref 8.6–10.5)
CHLORIDE SERPL-SCNC: 106 MMOL/L (ref 98–107)
CHOLEST SERPL-MCNC: 184 MG/DL (ref 0–200)
CLARITY UR: CLEAR
CO2 SERPL-SCNC: 24.4 MMOL/L (ref 22–29)
COLOR UR: YELLOW
CREAT SERPL-MCNC: 0.56 MG/DL (ref 0.57–1)
CREAT UR-MCNC: 96.2 MG/DL
DEPRECATED RDW RBC AUTO: 48 FL (ref 37–54)
EGFRCR SERPLBLD CKD-EPI 2021: 94.7 ML/MIN/1.73
EOSINOPHIL # BLD AUTO: 0.38 10*3/MM3 (ref 0–0.4)
EOSINOPHIL NFR BLD AUTO: 3.5 % (ref 0.3–6.2)
ERYTHROCYTE [DISTWIDTH] IN BLOOD BY AUTOMATED COUNT: 14.6 % (ref 12.3–15.4)
GLOBULIN UR ELPH-MCNC: 3 GM/DL
GLUCOSE SERPL-MCNC: 159 MG/DL (ref 65–99)
GLUCOSE UR STRIP-MCNC: NEGATIVE MG/DL
HBA1C MFR BLD: 7.4 % (ref 4.8–5.6)
HCT VFR BLD AUTO: 39 % (ref 34–46.6)
HDLC SERPL QL: 4
HDLC SERPL-MCNC: 46 MG/DL (ref 40–60)
HGB BLD-MCNC: 12.6 G/DL (ref 12–15.9)
HGB UR QL STRIP.AUTO: NEGATIVE
HYALINE CASTS UR QL AUTO: ABNORMAL /LPF
IMM GRANULOCYTES # BLD AUTO: 0.04 10*3/MM3 (ref 0–0.05)
IMM GRANULOCYTES NFR BLD AUTO: 0.4 % (ref 0–0.5)
KETONES UR QL STRIP: ABNORMAL
LDLC SERPL CALC-MCNC: 86 MG/DL (ref 0–100)
LEUKOCYTE ESTERASE UR QL STRIP.AUTO: ABNORMAL
LYMPHOCYTES # BLD AUTO: 3.49 10*3/MM3 (ref 0.7–3.1)
LYMPHOCYTES NFR BLD AUTO: 31.7 % (ref 19.6–45.3)
MCH RBC QN AUTO: 28.8 PG (ref 26.6–33)
MCHC RBC AUTO-ENTMCNC: 32.3 G/DL (ref 31.5–35.7)
MCV RBC AUTO: 89.2 FL (ref 79–97)
MICROALBUMIN/CREAT UR: NORMAL MG/G{CREAT}
MONOCYTES # BLD AUTO: 0.71 10*3/MM3 (ref 0.1–0.9)
MONOCYTES NFR BLD AUTO: 6.5 % (ref 5–12)
NEUTROPHILS NFR BLD AUTO: 57.4 % (ref 42.7–76)
NEUTROPHILS NFR BLD AUTO: 6.32 10*3/MM3 (ref 1.7–7)
NITRITE UR QL STRIP: NEGATIVE
NRBC BLD AUTO-RTO: 0 /100 WBC (ref 0–0.2)
PH UR STRIP.AUTO: 5.5 [PH] (ref 5–8)
PLATELET # BLD AUTO: 276 10*3/MM3 (ref 140–450)
PMV BLD AUTO: 11.1 FL (ref 6–12)
POTASSIUM SERPL-SCNC: 4.7 MMOL/L (ref 3.5–5.2)
PROT SERPL-MCNC: 6.9 G/DL (ref 6–8.5)
PROT UR QL STRIP: NEGATIVE
RBC # BLD AUTO: 4.37 10*6/MM3 (ref 3.77–5.28)
RBC # UR STRIP: ABNORMAL /HPF
REF LAB TEST METHOD: ABNORMAL
SODIUM SERPL-SCNC: 141 MMOL/L (ref 136–145)
SP GR UR STRIP: 1.02 (ref 1–1.03)
SQUAMOUS #/AREA URNS HPF: ABNORMAL /HPF
TRIGL SERPL-MCNC: 316 MG/DL (ref 0–150)
UROBILINOGEN UR QL STRIP: ABNORMAL
VIT B12 BLD-MCNC: 1022 PG/ML (ref 211–946)
VLDLC SERPL-MCNC: 52 MG/DL (ref 5–40)
WBC # UR STRIP: ABNORMAL /HPF
WBC NRBC COR # BLD: 11 10*3/MM3 (ref 3.4–10.8)

## 2023-05-19 PROCEDURE — 80061 LIPID PANEL: CPT | Performed by: FAMILY MEDICINE

## 2023-05-19 PROCEDURE — 87086 URINE CULTURE/COLONY COUNT: CPT | Performed by: FAMILY MEDICINE

## 2023-05-19 PROCEDURE — 82306 VITAMIN D 25 HYDROXY: CPT | Performed by: FAMILY MEDICINE

## 2023-05-19 PROCEDURE — 82043 UR ALBUMIN QUANTITATIVE: CPT | Performed by: FAMILY MEDICINE

## 2023-05-19 PROCEDURE — 80053 COMPREHEN METABOLIC PANEL: CPT | Performed by: FAMILY MEDICINE

## 2023-05-19 PROCEDURE — 83036 HEMOGLOBIN GLYCOSYLATED A1C: CPT | Performed by: FAMILY MEDICINE

## 2023-05-19 PROCEDURE — 36415 COLL VENOUS BLD VENIPUNCTURE: CPT | Performed by: FAMILY MEDICINE

## 2023-05-19 PROCEDURE — 82570 ASSAY OF URINE CREATININE: CPT | Performed by: FAMILY MEDICINE

## 2023-05-19 PROCEDURE — 82607 VITAMIN B-12: CPT | Performed by: FAMILY MEDICINE

## 2023-05-19 PROCEDURE — 84443 ASSAY THYROID STIM HORMONE: CPT | Performed by: FAMILY MEDICINE

## 2023-05-19 PROCEDURE — 85025 COMPLETE CBC W/AUTO DIFF WBC: CPT | Performed by: FAMILY MEDICINE

## 2023-05-19 PROCEDURE — 81001 URINALYSIS AUTO W/SCOPE: CPT | Performed by: FAMILY MEDICINE

## 2023-05-19 RX ORDER — ASPIRIN 81 MG/1
81 TABLET ORAL DAILY
COMMUNITY

## 2023-05-19 NOTE — PROGRESS NOTES
QUICK REFERENCE INFORMATION:  The ABCs of the Annual Wellness Visit    Subsequent Medicare Wellness Visit    HEALTH RISK ASSESSMENT    1945    Recent Hospitalizations:  No hospitalization(s) within the last year..        Current Medical Providers:  Patient Care Team:  Suraj Waters MD as PCP - General (Sports Medicine)        Smoking Status:  Social History     Tobacco Use   Smoking Status Every Day   Smokeless Tobacco Never       Alcohol Consumption:  Social History     Substance and Sexual Activity   Alcohol Use No       Depression Screen:       2022     8:47 AM   PHQ-2/PHQ-9 Depression Screening   Little Interest or Pleasure in Doing Things 0-->not at all   Feeling Down, Depressed or Hopeless 0-->not at all   PHQ-9: Brief Depression Severity Measure Score 0       Health Habits and Functional and Cognitive Screenin/17/2022     8:51 AM   Functional & Cognitive Status   Do you have difficulty preparing food and eating? No   Do you have difficulty bathing yourself, getting dressed or grooming yourself? No   Do you have difficulty using the toilet? No   Do you have difficulty moving around from place to place? Yes   Do you have trouble with steps or getting out of a bed or a chair? No   Current Diet Well Balanced Diet   Dental Exam Up to date   Eye Exam Up to date   Exercise (times per week) 7 times per week   Current Exercises Include Bicycling Outdoors   Do you need help using the phone?  No   Are you deaf or do you have serious difficulty hearing?  No   Do you need help with transportation? Yes   Do you need help shopping? No   Do you need help preparing meals?  No   Do you need help with housework?  No   Do you need help with laundry? No   Do you need help taking your medications? No   Do you need help managing money? No   Do you ever drive or ride in a car without wearing a seat belt? No           Does the patient have evidence of cognitive impairment? Yes - short term loss    Aspirin use  counseling: Taking ASA appropriately as indicated      Recent Lab Results:  CMP:  Lab Results   Component Value Date     (H) 03/11/2021    BUN 18 05/19/2023    CREATININE 0.56 (L) 05/19/2023    EGFRIFNONA 103 09/20/2021    EGFRIFAFRI 125 09/20/2021    BCR 32.1 (H) 05/19/2023     05/19/2023    K 4.7 05/19/2023    CO2 24.4 05/19/2023    CALCIUM 9.5 05/19/2023    PROTENTOTREF 7.3 05/17/2022    ALBUMIN 3.9 05/19/2023    LABGLOBREF 2.7 05/17/2022    LABIL2 1.7 05/17/2022    BILITOT 0.2 05/19/2023    ALKPHOS 89 05/19/2023    AST 14 05/19/2023    ALT 13 05/19/2023     Lipid Panel:  Lab Results   Component Value Date    CHOL 184 05/19/2023    TRIG 316 (H) 05/19/2023    HDL 46 05/19/2023    VLDL 52 (H) 05/19/2023     HbA1c:  Lab Results   Component Value Date    HGBA1C 7.40 (H) 05/19/2023       Visual Acuity:  No results found.    Age-appropriate Screening Schedule:  Refer to the list below for future screening recommendations based on patient's age, sex and/or medical conditions. Orders for these recommended tests are listed in the plan section. The patient has been provided with a written plan.    Health Maintenance   Topic Date Due   • DXA SCAN  Never done   • COVID-19 Vaccine (1) Never done   • ZOSTER VACCINE (2 of 2) 09/14/2017   • MAMMOGRAM  07/24/2020   • ANNUAL WELLNESS VISIT  05/17/2023   • INFLUENZA VACCINE  08/01/2023   • DIABETIC EYE EXAM  10/05/2023   • HEMOGLOBIN A1C  11/19/2023   • LIPID PANEL  05/19/2024   • URINE MICROALBUMIN  05/19/2024   • TDAP/TD VACCINES (2 - Td or Tdap) 07/20/2027   • COLORECTAL CANCER SCREENING  07/20/2027   • HEPATITIS C SCREENING  Completed   • Pneumococcal Vaccine 65+  Completed        Subjective   History of Present Illness    Cesilia Goel is a 77 y.o. female who presents for an Subsequent Wellness Visit.    The following portions of the patient's history were reviewed and updated as appropriate: allergies, current medications, past family history, past medical  history, past social history, past surgical history and problem list.    Outpatient Medications Prior to Visit   Medication Sig Dispense Refill   • ascorbic acid (VITAMIN C) 500 MG capsule controlled-release CR capsule Take  by mouth.     • aspirin 81 MG EC tablet Take 1 tablet by mouth Daily.     • atorvastatin (LIPITOR) 20 MG tablet TAKE 1 TABLET BY MOUTH DAILY 90 tablet 3   • Calcium Carbonate-Vitamin D 500-125 MG-UNIT tablet Take  by mouth.     • cholecalciferol (VITAMIN D3) 25 MCG (1000 UT) tablet Take 2 tablets by mouth Daily.     • cyanocobalamin (VITAMIN B-12) 1000 MCG tablet Take 1 tablet by mouth Daily.     • escitalopram (LEXAPRO) 10 MG tablet TAKE 1 TABLET BY MOUTH DAILY 90 tablet 3   • glipizide (GLUCOTROL) 5 MG tablet TAKE 1 TABLET BY MOUTH TWICE DAILY BEFORE MEALS 180 tablet 0   • losartan (COZAAR) 100 MG tablet TAKE 1 TABLET BY MOUTH DAILY 90 tablet 3   • metFORMIN (GLUCOPHAGE) 1000 MG tablet TAKE 1 TABLET BY MOUTH TWICE DAILY AFTER A MEAL 60 tablet 11   • Multiple Vitamin (MULTIVITAMIN) tablet Take 1 tablet by mouth.     • MYRBETRIQ 50 MG tablet sustained-release 24 hour 24 hr tablet Take  by mouth Daily.  3   • sodium chloride 0.9 % solution 250 mL with Ocrelizumab 300 MG/10ML solution Infuse  into a venous catheter. (Patient not taking: Reported on 5/19/2023)       No facility-administered medications prior to visit.       Patient Active Problem List   Diagnosis   • Type 2 diabetes mellitus with diabetic neuropathy, without long-term current use of insulin   • Allergic rhinitis   • Mild single current episode of major depressive disorder   • Dermatitis, eczematoid   • HLD (hyperlipidemia)   • Hypertension   • Multiple sclerosis, primary progressive   • Absence of bladder continence   • LOUIE (obstructive sleep apnea)   • Hypersomnia   • Current smoker       Advance Care Planning:  ACP discussion was held with the patient during this visit. Patient has an advance directive in EMR which is still  "valid.     Identification of Risk Factors:  Risk factors include: Cardiovascular risk  Dementia/Memory   Diabetic Lab Screening   Immunizations Discussed/Encouraged (specific immunizations; Prevnar 20 (Pneumococcal 20-valent conjugate) )  Tobacco Use/Dependance (use dotphrase .tobaccocessation for documentation).    Review of Systems    Compared to one year ago, the patient feels her physical health is the same.  Compared to one year ago, the patient feels her mental health is the same.    Objective     Physical Exam  Vitals reviewed.   Constitutional:       General: She is not in acute distress.     Appearance: She is not ill-appearing or diaphoretic.   HENT:      Mouth/Throat:      Mouth: Mucous membranes are moist.   Eyes:      Pupils: Pupils are equal, round, and reactive to light.   Cardiovascular:      Rate and Rhythm: Normal rate and regular rhythm.      Pulses: Normal pulses.      Heart sounds: Normal heart sounds.   Pulmonary:      Effort: Pulmonary effort is normal.      Breath sounds: No decreased air movement. Examination of the right-lower field reveals rales. Examination of the left-lower field reveals rales. Rales present. No decreased breath sounds.   Neurological:      Mental Status: She is alert. Mental status is at baseline.   Psychiatric:         Mood and Affect: Mood normal.         Thought Content: Thought content normal.         Judgment: Judgment normal.         Vitals:    05/19/23 1253   BP: 126/66   BP Location: Right arm   Patient Position: Sitting   Cuff Size: Adult   Pulse: 102   Temp: 98 °F (36.7 °C)   TempSrc: Temporal   SpO2: 96%   Weight: 72.6 kg (160 lb)   Height: 162.6 cm (64.02\")       BMI is >= 25 and <30. (Overweight) The following options were offered after discussion;: weight loss educational material (shared in after visit summary) and exercise counseling/recommendations      Assessment & Plan   Patient Self-Management and Personalized Health Advice  The patient has been " provided with information about: exercise, weight management, prevention of cardiac or vascular disease and tobacco cessation and preventive services including:   · Annual Wellness Visit (AWV)  · Counseling to Prevent Tobacco Use (use of smartset and @cessation@ smartphrase for documentation)  · Depression Screening (15 minutes face to face, Code )  · Diabetes Screening-Lab Order for either glucose quantitative blood (except reagent strip), glucose;post glucose dose(includes glucose), or glucose tolerance test-3 specimens(includes glucose)  · Pneumococcal Vaccine and Administration.    Visit Diagnoses:    ICD-10-CM ICD-9-CM   1. Medicare annual wellness visit, subsequent  Z00.00 V70.0   2. Type 2 diabetes mellitus with diabetic neuropathy, without long-term current use of insulin  E11.40 250.60     357.2   3. Mild single current episode of major depressive disorder  F32.0 296.21   4. Simple chronic bronchitis  J41.0 491.0   5. Need for pneumococcal vaccination  Z23 V03.82       Orders Placed This Encounter   Procedures   • Pneumococcal Conjugate Vaccine 20-Valent All       Outpatient Encounter Medications as of 5/19/2023   Medication Sig Dispense Refill   • ascorbic acid (VITAMIN C) 500 MG capsule controlled-release CR capsule Take  by mouth.     • aspirin 81 MG EC tablet Take 1 tablet by mouth Daily.     • atorvastatin (LIPITOR) 20 MG tablet TAKE 1 TABLET BY MOUTH DAILY 90 tablet 3   • Calcium Carbonate-Vitamin D 500-125 MG-UNIT tablet Take  by mouth.     • cholecalciferol (VITAMIN D3) 25 MCG (1000 UT) tablet Take 2 tablets by mouth Daily.     • cyanocobalamin (VITAMIN B-12) 1000 MCG tablet Take 1 tablet by mouth Daily.     • escitalopram (LEXAPRO) 10 MG tablet TAKE 1 TABLET BY MOUTH DAILY 90 tablet 3   • glipizide (GLUCOTROL) 5 MG tablet TAKE 1 TABLET BY MOUTH TWICE DAILY BEFORE MEALS 180 tablet 0   • losartan (COZAAR) 100 MG tablet TAKE 1 TABLET BY MOUTH DAILY 90 tablet 3   • metFORMIN (GLUCOPHAGE) 1000 MG  tablet TAKE 1 TABLET BY MOUTH TWICE DAILY AFTER A MEAL 60 tablet 11   • Multiple Vitamin (MULTIVITAMIN) tablet Take 1 tablet by mouth.     • MYRBETRIQ 50 MG tablet sustained-release 24 hour 24 hr tablet Take  by mouth Daily.  3   • [DISCONTINUED] sodium chloride 0.9 % solution 250 mL with Ocrelizumab 300 MG/10ML solution Infuse  into a venous catheter. (Patient not taking: Reported on 5/19/2023)       No facility-administered encounter medications on file as of 5/19/2023.       Reviewed use of high risk medication in the elderly: yes  Reviewed for potential of harmful drug interactions in the elderly: yes    Follow Up:  No follow-ups on file.     An After Visit Summary and PPPS with all of these plans were given to the patient.           Overall well/stable.   Neuro stopped Ocrevus infusions; continues guided imagery daily. Continues daily exercise.  Uses power scooter for breaks when needed, walker other times.     Physical exam notable for some bibasilar crackles.  She does have established diagnosis of chronic bronchitis, suspect this is a manifestation of that.  Discussed importance of tobacco cessation.    Discussed vaccination, she agrees to pneumococcal vaccine today.

## 2023-05-20 LAB — TSH SERPL DL<=0.005 MIU/L-ACNC: 1.59 UIU/ML (ref 0.45–4.5)

## 2023-05-21 LAB — BACTERIA SPEC AEROBE CULT: NORMAL

## 2023-05-23 RX ORDER — ATORVASTATIN CALCIUM 20 MG/1
20 TABLET, FILM COATED ORAL DAILY
Qty: 90 TABLET | Refills: 3 | Status: SHIPPED | OUTPATIENT
Start: 2023-05-23

## 2023-05-24 RX ORDER — GLIPIZIDE 5 MG/1
TABLET ORAL
Qty: 180 TABLET | Refills: 0 | Status: SHIPPED | OUTPATIENT
Start: 2023-05-24

## 2023-06-13 RX ORDER — LOSARTAN POTASSIUM 100 MG/1
TABLET ORAL
Qty: 90 TABLET | Refills: 3 | Status: SHIPPED | OUTPATIENT
Start: 2023-06-13

## 2023-09-05 RX ORDER — GLIPIZIDE 5 MG/1
TABLET ORAL
Qty: 180 TABLET | Refills: 3 | Status: SHIPPED | OUTPATIENT
Start: 2023-09-05

## 2023-10-18 DIAGNOSIS — E11.40 TYPE 2 DIABETES MELLITUS WITH DIABETIC NEUROPATHY, WITHOUT LONG-TERM CURRENT USE OF INSULIN: ICD-10-CM

## 2023-10-24 ENCOUNTER — LAB (OUTPATIENT)
Dept: LAB | Facility: HOSPITAL | Age: 78
End: 2023-10-24
Payer: MEDICARE

## 2023-10-24 DIAGNOSIS — E11.40 TYPE 2 DIABETES MELLITUS WITH DIABETIC NEUROPATHY, WITHOUT LONG-TERM CURRENT USE OF INSULIN: Primary | ICD-10-CM

## 2023-10-24 LAB — HBA1C MFR BLD: 7.6 % (ref 4.8–5.6)

## 2023-10-24 PROCEDURE — 83036 HEMOGLOBIN GLYCOSYLATED A1C: CPT | Performed by: FAMILY MEDICINE

## 2023-10-24 PROCEDURE — 36415 COLL VENOUS BLD VENIPUNCTURE: CPT | Performed by: FAMILY MEDICINE

## 2023-10-26 NOTE — PROGRESS NOTES
Called patient via phone and verbally relayed results and recommendations. All information was verbally understood by the patient.   Scheduled 4 month f/u and mailed copy to the patient     Thank you  Lian

## 2023-11-13 NOTE — PROGRESS NOTES
"[facility] SLEEP MEDICINE  [address]  [place] [facility] [address] [phone]    PCP: Suraj Waters MD    Reason for visit:  Sleep disorders: LOUIE    Cesilia \"Pat\" is a 78 y.o.female who was seen in the Sleep Disorders Center today. Annual fu. She sleeps from 11pm to 9am. She uses nasal pillows. Its comfortable and she wakes up rested.  She is a current smoker.  [place] Sleepiness Scale is 4. Caffeine 0 per day. Alcohol 0 per week.    Cesilia \"Pat\"  reports that she has been smoking. She has never used smokeless tobacco.    Pertinent Positive Review of Systems of PND  Rest of Review of Systems was negative as recorded in Sleep Questionnaire.    Patient  has a past medical history of Diabetes mellitus, Hyperlipidemia, and Hypertension.     Current Medications:    Current Outpatient Medications:     ascorbic acid (VITAMIN C) 500 MG capsule controlled-release CR capsule, Take  by mouth., Disp: , Rfl:     aspirin 81 MG EC tablet, Take 1 tablet by mouth Daily., Disp: , Rfl:     atorvastatin (LIPITOR) 20 MG tablet, TAKE 1 TABLET BY MOUTH DAILY, Disp: 90 tablet, Rfl: 3    Calcium Carbonate-Vitamin D 500-125 MG-UNIT tablet, Take  by mouth., Disp: , Rfl:     cholecalciferol (VITAMIN D3) 25 MCG (1000 UT) tablet, Take 2 tablets by mouth Daily., Disp: , Rfl:     cyanocobalamin (VITAMIN B-12) 1000 MCG tablet, Take 1 tablet by mouth Daily., Disp: , Rfl:     escitalopram (LEXAPRO) 10 MG tablet, TAKE 1 TABLET BY MOUTH DAILY, Disp: 90 tablet, Rfl: 3    glipizide (GLUCOTROL) 5 MG tablet, TAKE 1 TABLET BY MOUTH TWICE DAILY BEFORE MEALS, Disp: 180 tablet, Rfl: 3    losartan (COZAAR) 100 MG tablet, TAKE 1 TABLET BY MOUTH DAILY, Disp: 90 tablet, Rfl: 3    metFORMIN (GLUCOPHAGE) 1000 MG tablet, TAKE 1 TABLET BY MOUTH TWICE DAILY AFTER A MEAL, Disp: 60 tablet, Rfl: 11    Multiple Vitamin (MULTIVITAMIN) tablet, Take 1 tablet by mouth., Disp: , Rfl:     MYRBETRIQ 50 MG tablet sustained-release 24 hour 24 hr " "tablet, Take  by mouth Daily., Disp: , Rfl: 3   also entered in Sleep Questionnaire         Vital Signs: /66   Pulse 108   Ht 162.6 cm (64.02\")   Wt 72.6 kg (160 lb)   SpO2 94%   BMI 27.45 kg/m²     Body mass index is 27.45 kg/m².       Tongue: Large       Dentition: good       Pharynx: Posterior pharyngeal pillars are wide   Mallampatti: IV (only hard palate visible)        General: Alert. Cooperative. Well developed. No acute distress.             Head:  Normocephalic. Symmetrical. Atraumatic.              Nose: No septal deviation. No drainage.          Throat: No oral lesions. No thrush. Moist mucous membranes.    Chest Wall:  Normal shape. Symmetric expansion with respiration. No tenderness.             Neck:  Trachea midline.           Lungs:  Clear to auscultation bilaterally. No wheezes. No rhonchi. No rales. Respirations regular, even and unlabored.            Heart:  Regular rhythm and normal rate. Normal S1 and S2. No murmur.     Abdomen:  Soft, non-tender and non-distended. Normal bowel sounds. No masses.   Psychiatric: Normal mood and affect.  Using a walker.      Diagnostic data available to date is as below and was reviewed on current visit:  Study- severe obstructive sleep apnea syndrome and her polysomnography in the past has revealed apnea-hypopnea index of AHI 83.6 per sleep hour, minimum SpO2 of 79%     No results found for: \"IRON\", \"TIBC\", \"FERRITIN\"    Most current available usage data reviewed on 11/14/2023:  No scans are attached to the encounter or orders placed in the encounter.  95% compliance average 9 hours 46 minutes AHI 5.4 average pressure 12 to 15 cm.    [facility] [employer]: [employer]    Prescription to [facility] for replacement supplies as below:    nasal pillow      Description Replacement    Nasal PILLOWS     x A 7034 Nasal Pillows  every 3 mth   x A 7033 Repl Nasal Pillows  2 per mth    Nasal MASK/CUSHION      A 7034 Nasal Mask/Cushion  every 3 mth    A 7032 Repl Nasal Mask/Cushion  2 " "per mth    Full Face MASK      A 7030 Full Face Mask  every 3 mth    A 7031 Repl Face Mask  1 per mth      A 4604 Heated Tubing  every 3 mth    A 7037 Standard Tubing  every 3 mth   x A 7035 Headgear  every 3 mth   x A 7046 Repl Humidifier Chamber  every 6 yrs   x A 7038 Disposable Filters  2 per mth   x A 7039 Non-disposable Filter  every 6 mth   x A 7036 Chin Strap  every 6 mth     No orders of the defined types were placed in this encounter.         Impression:  1. Obstructive sleep apnea    2. Overweight (BMI 25.0-29.9)    3. Multiple sclerosis, primary progressive        Plan:  Cesilia \"Pat\" is doing well.  She will be eligible for a new machine in March 2024.  As one of the parts on her current machine is not working very well we will arrange for a replacement then.    I reiterated the importance of effective treatment of obstructive sleep apnea with PAP machine.  Cardiovascular health risks of untreated sleep apnea were again reviewed.  Patient was asked to remain cautious if there is persistent hypersomnolence. The benefit of weight loss in reducing severity of obstructive sleep apnea was discussed.  Patient would benefit from adhering to a strict diet to achieve ideal BMI.     Change of PAP supplies regularly is important for effective use.  Change of cushion on the mask or plugs on nasal pillows along with disposable filters once every month and change of mask frame, tubing, headgear and Velcro straps every 6 months at the minimum was reiterated.    This patient is compliant with PAP machine and benefits from its use.  Apnea hypopneas index is corrected/improved.  Daytime hypersomnolence has resolved.     Patient will follow up in this clinic in 6 months  aprn.    Thank you for allowing me to participate in your patient's care.    Electronically signed by Aiden Ely MD, 11/13/23, 1:04 PM EST.    Part of this note may be an electronic transcription/translation of spoken language to printed text using the " [facility] Dictation System.

## 2023-11-14 ENCOUNTER — OFFICE VISIT (OUTPATIENT)
Dept: SLEEP MEDICINE | Facility: HOSPITAL | Age: 78
End: 2023-11-14
Payer: MEDICARE

## 2023-11-14 VITALS
DIASTOLIC BLOOD PRESSURE: 66 MMHG | OXYGEN SATURATION: 94 % | WEIGHT: 160 LBS | HEIGHT: 64 IN | BODY MASS INDEX: 27.31 KG/M2 | HEART RATE: 108 BPM | SYSTOLIC BLOOD PRESSURE: 136 MMHG

## 2023-11-14 DIAGNOSIS — G35 MULTIPLE SCLEROSIS, PRIMARY PROGRESSIVE: ICD-10-CM

## 2023-11-14 DIAGNOSIS — G47.33 OBSTRUCTIVE SLEEP APNEA: Primary | ICD-10-CM

## 2023-11-14 DIAGNOSIS — E66.3 OVERWEIGHT (BMI 25.0-29.9): ICD-10-CM

## 2023-11-14 PROCEDURE — G0463 HOSPITAL OUTPT CLINIC VISIT: HCPCS

## 2024-02-01 ENCOUNTER — LAB (OUTPATIENT)
Dept: LAB | Facility: HOSPITAL | Age: 79
End: 2024-02-01
Payer: MEDICARE

## 2024-02-01 ENCOUNTER — OFFICE VISIT (OUTPATIENT)
Dept: SPORTS MEDICINE | Facility: CLINIC | Age: 79
End: 2024-02-01
Payer: MEDICARE

## 2024-02-01 VITALS
RESPIRATION RATE: 16 BRPM | OXYGEN SATURATION: 96 % | BODY MASS INDEX: 28.68 KG/M2 | HEART RATE: 100 BPM | WEIGHT: 168 LBS | DIASTOLIC BLOOD PRESSURE: 70 MMHG | SYSTOLIC BLOOD PRESSURE: 120 MMHG | HEIGHT: 64 IN

## 2024-02-01 DIAGNOSIS — E11.40 TYPE 2 DIABETES MELLITUS WITH DIABETIC NEUROPATHY, WITHOUT LONG-TERM CURRENT USE OF INSULIN: ICD-10-CM

## 2024-02-01 DIAGNOSIS — M79.642 LEFT HAND PAIN: Primary | ICD-10-CM

## 2024-02-01 DIAGNOSIS — S62.645A CLOSED NONDISPLACED FRACTURE OF PROXIMAL PHALANX OF LEFT RING FINGER, INITIAL ENCOUNTER: ICD-10-CM

## 2024-02-01 LAB — HBA1C MFR BLD: 8.3 % (ref 4.8–5.6)

## 2024-02-01 PROCEDURE — 83036 HEMOGLOBIN GLYCOSYLATED A1C: CPT | Performed by: FAMILY MEDICINE

## 2024-02-01 PROCEDURE — 36415 COLL VENOUS BLD VENIPUNCTURE: CPT | Performed by: FAMILY MEDICINE

## 2024-02-01 NOTE — PROGRESS NOTES
"Cesilia is a 78 y.o. year old female    Chief Complaint   Patient presents with    Follow-up     F/u eval for A1C from 10/2023 - 4 month f/u - not fasting today -  with her today     Fall     Eval for fall Sunday, 01/28/2024 - left hand is bothering her and painful        History of Present Illness   HPI   F/u DM. Inconsistent diet. Exercising on elliptical.   L hand pain s/p fall on 1/28. Pain around 4th MC. Normal use of hand.     Review of Systems    /70 (BP Location: Left arm, Patient Position: Sitting, Cuff Size: Adult)   Pulse 100   Resp 16   Ht 162.6 cm (64.02\")   Wt 76.2 kg (168 lb)   SpO2 96%   BMI 28.82 kg/m²          Physical Exam  Vitals reviewed.   Pulmonary:      Effort: Pulmonary effort is normal.   Musculoskeletal:      Comments: L hand normal appearance. TTP distal 4th metacarpal into the MCP joint. Normal ROM and tendon function of all digits. Sensation wnl.    Neurological:      Mental Status: She is alert.   Psychiatric:      Comments:  reports more memory difficulty           Current Outpatient Medications:     ascorbic acid (VITAMIN C) 500 MG capsule controlled-release CR capsule, Take  by mouth., Disp: , Rfl:     aspirin 81 MG EC tablet, Take 1 tablet by mouth Daily., Disp: , Rfl:     atorvastatin (LIPITOR) 20 MG tablet, TAKE 1 TABLET BY MOUTH DAILY, Disp: 90 tablet, Rfl: 3    Calcium Carbonate-Vitamin D 500-125 MG-UNIT tablet, Take  by mouth., Disp: , Rfl:     cholecalciferol (VITAMIN D3) 25 MCG (1000 UT) tablet, Take 2 tablets by mouth Daily., Disp: , Rfl:     cyanocobalamin (VITAMIN B-12) 1000 MCG tablet, Take 1 tablet by mouth Daily., Disp: , Rfl:     escitalopram (LEXAPRO) 10 MG tablet, TAKE 1 TABLET BY MOUTH DAILY, Disp: 90 tablet, Rfl: 3    glipizide (GLUCOTROL) 5 MG tablet, TAKE 1 TABLET BY MOUTH TWICE DAILY BEFORE MEALS, Disp: 180 tablet, Rfl: 3    losartan (COZAAR) 100 MG tablet, TAKE 1 TABLET BY MOUTH DAILY, Disp: 90 tablet, Rfl: 3    metFORMIN " (GLUCOPHAGE) 1000 MG tablet, TAKE 1 TABLET BY MOUTH TWICE DAILY AFTER A MEAL, Disp: 60 tablet, Rfl: 11    Multiple Vitamin (MULTIVITAMIN) tablet, Take 1 tablet by mouth., Disp: , Rfl:     MYRBETRIQ 50 MG tablet sustained-release 24 hour 24 hr tablet, Take  by mouth Daily., Disp: , Rfl: 3     Left Hand X-Ray  Indication: Pain  AP, Lateral, and Oblique views    Findings:  Minimally displaced fracture radial aspect base of fourth digit proximal phalanx extending to the MCP joint.   No bony lesion  Normal soft tissues  Normal joint spaces    No prior studies were available for comparison.     Diagnoses and all orders for this visit:    Left hand pain  -     XR Hand 3+ View Left    Type 2 diabetes mellitus with diabetic neuropathy, without long-term current use of insulin  -     Hemoglobin A1c    Closed nondisplaced fracture of proximal phalanx of left ring finger, initial encounter       Recheck A1c today to follow-up on diabetes control.  César tape fourth and fifth digits to manage this low risk fracture given her clinical context.  Recheck in 4 weeks.      EMR Dragon/Transcription disclaimer:    Much of this encounter note is an electronic transcription/translation of spoken language to printed text.  The electronic translation of spoken language may permit erroneous, or at times, nonsensical words or phrases to be inadvertently transcribed.  Although I have reviewed the note for such errors some may still exist.

## 2024-02-02 ENCOUNTER — TELEPHONE (OUTPATIENT)
Dept: SLEEP MEDICINE | Facility: HOSPITAL | Age: 79
End: 2024-02-02
Payer: MEDICARE

## 2024-03-04 ENCOUNTER — OFFICE VISIT (OUTPATIENT)
Dept: SPORTS MEDICINE | Facility: CLINIC | Age: 79
End: 2024-03-04
Payer: MEDICARE

## 2024-03-04 VITALS
HEART RATE: 97 BPM | WEIGHT: 168 LBS | DIASTOLIC BLOOD PRESSURE: 74 MMHG | TEMPERATURE: 97.5 F | HEIGHT: 64 IN | OXYGEN SATURATION: 94 % | SYSTOLIC BLOOD PRESSURE: 138 MMHG | BODY MASS INDEX: 28.68 KG/M2

## 2024-03-04 DIAGNOSIS — S62.645A CLOSED NONDISPLACED FRACTURE OF PROXIMAL PHALANX OF LEFT RING FINGER, INITIAL ENCOUNTER: Primary | ICD-10-CM

## 2024-03-04 DIAGNOSIS — E11.40 TYPE 2 DIABETES MELLITUS WITH DIABETIC NEUROPATHY, WITHOUT LONG-TERM CURRENT USE OF INSULIN: ICD-10-CM

## 2024-03-04 RX ORDER — PIOGLITAZONEHYDROCHLORIDE 15 MG/1
15 TABLET ORAL DAILY
Qty: 90 TABLET | Refills: 1 | Status: SHIPPED | OUTPATIENT
Start: 2024-03-04

## 2024-03-04 NOTE — PROGRESS NOTES
"Cesilia is a 78 y.o. year old female     Chief Complaint   Patient presents with    Hand Pain     LEFT HAND- Patient is here to follow up on left hand.     Follow-up     Patient is following up on A1c results, done on 02/01.       History of Present Illness  HPI   F/u L hand fx - having some intermittent pain but better with naproxen.   F/u DM - working on diet. Januvia too expensive.       Review of Systems    /74 (BP Location: Left arm, Patient Position: Sitting, Cuff Size: Adult)   Pulse 97   Temp 97.5 °F (36.4 °C) (Temporal)   Ht 162.6 cm (64.02\")   Wt 76.2 kg (168 lb)   SpO2 94%   BMI 28.82 kg/m²      Physical Exam    Vital signs reviewed.   General: No acute distress.      MSK Exam:  Ortho Exam  L  hand: Mild TTP proximal 4th finger phalanx. Normal ROM.     Left Hand X-Ray  Indication: f/u fracture  AP, Lateral, and Oblique views    Findings:  Compared to prior study the fracture of the 4th proximal phalanx at the base is similar in appearance, with a possible slight increase in displacement. No significant evidence of healing.     Procedures     Diagnoses and all orders for this visit:    Closed nondisplaced fracture of proximal phalanx of left ring finger, initial encounter  -     XR Hand 3+ View Left    Type 2 diabetes mellitus with diabetic neuropathy, without long-term current use of insulin  -     pioglitazone (Actos) 15 MG tablet; Take 1 tablet by mouth Daily.    Overall based on her clinical status I think continued conservative treatment remains reasonable; discussed rashad taping digits 3-5 for added protection.   Try change to actos for DM control at lower cost, avoiding injectables and other risks for hypoglycemia.       EMR Dragon/Transcription disclaimer:    Much of this encounter note is an electronic transcription/translation of spoken language to printed text.  The electronic translation of spoken language may permit erroneous, or at times, nonsensical words or phrases to be " inadvertently transcribed.  Although I have reviewed the note for such errors some may still exist.

## 2024-04-04 ENCOUNTER — OFFICE VISIT (OUTPATIENT)
Dept: SPORTS MEDICINE | Facility: CLINIC | Age: 79
End: 2024-04-04
Payer: MEDICARE

## 2024-04-04 VITALS
OXYGEN SATURATION: 93 % | BODY MASS INDEX: 28.68 KG/M2 | TEMPERATURE: 96.1 F | DIASTOLIC BLOOD PRESSURE: 72 MMHG | HEART RATE: 63 BPM | SYSTOLIC BLOOD PRESSURE: 128 MMHG | HEIGHT: 64 IN | WEIGHT: 168 LBS

## 2024-04-04 DIAGNOSIS — S62.645A CLOSED NONDISPLACED FRACTURE OF PROXIMAL PHALANX OF LEFT RING FINGER, INITIAL ENCOUNTER: Primary | ICD-10-CM

## 2024-04-04 NOTE — PROGRESS NOTES
"Cesilia is a 78 y.o. year old female     Chief Complaint   Patient presents with    Hand Pain     LEFT HAND- Patient is here to follow up on left hand fracture, patient states she has no pain.        History of Present Illness  History of Present Illness  The patient is here to follow up on left fourth digit proximal phalanx fracture.    Since her last visit, the patient has been faring well. She reports no pain and has been utilizing her hand without significant concerns or considering the use of rashad taping.    I have reviewed the patient's medical, family, and social history in detail and updated the computerized patient record.    Review of Systems    /72 (BP Location: Left arm, Patient Position: Sitting, Cuff Size: Large Adult)   Pulse 63   Temp 96.1 °F (35.6 °C) (Temporal)   Ht 162.6 cm (64.02\")   Wt 76.2 kg (168 lb)   SpO2 93%   BMI 28.82 kg/m²      Physical Exam    Vital signs reviewed.   General: No acute distress.      Physical Exam  The patient's left hand appears normal. There is normal tendon function, normal range of motion without strength or difficulty, normal strength with , abduction, adduction of the hand, specifically the fourth digit without any reproduced pain. There is no pain with palpation or manipulation of the fourth MCP joint nor the proximal phalanx there.    Results  Results  Imaging  Left hand x-rays: Fracture at the base of the fourth proximal phalanx appears unchanged compared to previous imaging.    Procedures     Diagnoses and all orders for this visit:    Closed nondisplaced fracture of proximal phalanx of left ring finger, initial encounter      Assessment & Plan  1. Fracture of the left fourth digit.  The patient's fracture has not fully healed, potentially attributed to a slow healing process without signs of maturation at the fracture site. Alternatively, the healing process may not fully heal, but there is a development of a fibrous union. The third " possibility is that the fracture is a chronically nonunited fracture. Considering the patient's clinical situation, the small nature of the fracture, and other health issues, it is deemed appropriate to manage this practically and monitor her progress with the return to normal function of her hands. Should the patient experience recurrent pain, a consultation with hand surgery for a second opinion may be considered.    Patient or patient representative verbalized consent for the use of Ambient Listening during the visit with  Suraj Waters MD for chart documentation. 4/5/2024  17:36 EDT    Suraj Waters MD   14:09 EDT   04/04/24

## 2024-05-21 RX ORDER — ESCITALOPRAM OXALATE 10 MG/1
TABLET ORAL
Qty: 90 TABLET | Refills: 3 | Status: SHIPPED | OUTPATIENT
Start: 2024-05-21

## 2024-05-30 ENCOUNTER — LAB (OUTPATIENT)
Dept: LAB | Facility: HOSPITAL | Age: 79
End: 2024-05-30
Payer: MEDICARE

## 2024-05-30 ENCOUNTER — OFFICE VISIT (OUTPATIENT)
Dept: SPORTS MEDICINE | Facility: CLINIC | Age: 79
End: 2024-05-30
Payer: MEDICARE

## 2024-05-30 VITALS
BODY MASS INDEX: 28.68 KG/M2 | SYSTOLIC BLOOD PRESSURE: 128 MMHG | DIASTOLIC BLOOD PRESSURE: 68 MMHG | HEIGHT: 64 IN | OXYGEN SATURATION: 97 % | TEMPERATURE: 97.4 F | WEIGHT: 168 LBS | HEART RATE: 87 BPM

## 2024-05-30 DIAGNOSIS — E11.40 TYPE 2 DIABETES MELLITUS WITH DIABETIC NEUROPATHY, WITHOUT LONG-TERM CURRENT USE OF INSULIN: Primary | ICD-10-CM

## 2024-05-30 LAB — HBA1C MFR BLD: 7.3 % (ref 4.8–5.6)

## 2024-05-30 PROCEDURE — 36415 COLL VENOUS BLD VENIPUNCTURE: CPT | Performed by: FAMILY MEDICINE

## 2024-05-30 PROCEDURE — 99213 OFFICE O/P EST LOW 20 MIN: CPT | Performed by: FAMILY MEDICINE

## 2024-05-30 PROCEDURE — 3078F DIAST BP <80 MM HG: CPT | Performed by: FAMILY MEDICINE

## 2024-05-30 PROCEDURE — 83036 HEMOGLOBIN GLYCOSYLATED A1C: CPT | Performed by: FAMILY MEDICINE

## 2024-05-30 PROCEDURE — 3074F SYST BP LT 130 MM HG: CPT | Performed by: FAMILY MEDICINE

## 2024-05-30 NOTE — PROGRESS NOTES
"Cesilia is a 78 y.o. year old female     Chief Complaint   Patient presents with    Diabetes     Follow up on her diabetes.        History of Present Illness  History of Present Illness  The patient is here today to follow up on type 2 diabetes.    The patient's condition has remained stable since her last visit. She and her spouse have been diligently monitoring her diet, with a reduction in carbohydrates and sweets.    I have reviewed the patient's medical, family, and social history in detail and updated the computerized patient record.    Review of Systems    /68 (BP Location: Right arm, Patient Position: Sitting, Cuff Size: Adult)   Pulse 87   Temp 97.4 °F (36.3 °C) (Temporal)   Ht 162.6 cm (64.02\")   Wt 76.2 kg (168 lb)   SpO2 97%   BMI 28.82 kg/m²      Physical Exam    Vital signs reviewed.   General: No acute distress.      Physical Exam  The patient's mood and affect are stable.    Results  Results      Procedures     Diagnoses and all orders for this visit:    Type 2 diabetes mellitus with diabetic neuropathy, without long-term current use of insulin      Assessment & Plan  1. Type 2 diabetes.  Today, I plan to reassess the patient's A1c levels.    Follow-up  The patient is scheduled for a follow-up visit in 3 months for her annual wellness visit.    *Patient's  also states increasing concern about her cognitive status, will discuss with her neurologist    Patient or patient representative verbalized consent for the use of Ambient Listening during the visit with  Suraj Waters MD for chart documentation. 5/30/2024  10:32 EDT  *Dictated after leaving exam room.   Suraj Waters MD   09:49 EDT   05/30/24   "

## 2024-06-06 ENCOUNTER — TELEPHONE (OUTPATIENT)
Dept: SPORTS MEDICINE | Facility: CLINIC | Age: 79
End: 2024-06-06

## 2024-06-06 NOTE — TELEPHONE ENCOUNTER
The [facility] received a fax that requires your attention. The document has been indexed to the patient’s chart for your review.      Reason for sending: RECEIVED REQUEST FOR PHYSICIAN ORDER FROM [employer]    Documents Description: ORDER REQUEST-[employer] software [facility]-6/3/2024    Name of Sender: [employer]    Date Indexed: 6/6/2024

## 2024-06-10 RX ORDER — ATORVASTATIN CALCIUM 20 MG/1
20 TABLET, FILM COATED ORAL DAILY
Qty: 90 TABLET | Refills: 3 | Status: SHIPPED | OUTPATIENT
Start: 2024-06-10

## 2024-06-12 DIAGNOSIS — E11.40 TYPE 2 DIABETES MELLITUS WITH DIABETIC NEUROPATHY, WITHOUT LONG-TERM CURRENT USE OF INSULIN: ICD-10-CM

## 2024-06-13 RX ORDER — PIOGLITAZONEHYDROCHLORIDE 15 MG/1
15 TABLET ORAL DAILY
Qty: 90 TABLET | Refills: 1 | Status: SHIPPED | OUTPATIENT
Start: 2024-06-13

## 2024-06-26 RX ORDER — LOSARTAN POTASSIUM 100 MG/1
TABLET ORAL
Qty: 90 TABLET | Refills: 3 | Status: SHIPPED | OUTPATIENT
Start: 2024-06-26

## 2024-07-10 ENCOUNTER — OFFICE VISIT (OUTPATIENT)
Dept: SLEEP MEDICINE | Facility: HOSPITAL | Age: 79
End: 2024-07-10
Payer: MEDICARE

## 2024-07-10 ENCOUNTER — TELEPHONE (OUTPATIENT)
Dept: SLEEP MEDICINE | Facility: HOSPITAL | Age: 79
End: 2024-07-10
Payer: MEDICARE

## 2024-07-10 VITALS
WEIGHT: 165 LBS | DIASTOLIC BLOOD PRESSURE: 74 MMHG | OXYGEN SATURATION: 94 % | BODY MASS INDEX: 28.17 KG/M2 | SYSTOLIC BLOOD PRESSURE: 141 MMHG | HEIGHT: 64 IN | HEART RATE: 90 BPM

## 2024-07-10 DIAGNOSIS — Z78.9 DIFFICULTY WITH CPAP NASAL MASK USE: ICD-10-CM

## 2024-07-10 DIAGNOSIS — G47.33 OBSTRUCTIVE SLEEP APNEA: Primary | ICD-10-CM

## 2024-07-10 DIAGNOSIS — E66.3 OVERWEIGHT (BMI 25.0-29.9): ICD-10-CM

## 2024-07-10 DIAGNOSIS — G35 MULTIPLE SCLEROSIS, PRIMARY PROGRESSIVE: ICD-10-CM

## 2024-07-10 PROCEDURE — G0463 HOSPITAL OUTPT CLINIC VISIT: HCPCS

## 2024-07-10 NOTE — TELEPHONE ENCOUNTER
Pressure change for patient from LAVERNE Wallace. Made changes in [employer]     07/10/2024, 08:21 AM    by Popeye Delacruz    Settings sent to device    Set Mode to AutoSet  Set Min Pressure to 8.0 cmH2O  Set Max Pressure to 15.0 cmH2O  Set Response to Standard  Set EPR to Fulltime  Set EPR level to 2  Set Ramp enable to On  Set Ramp time to 30 min  Set Start pressure to 4.0 cmH2O  Previous Settings    Set Mode to AutoSet  Set Min Pressure to 8.0 cmH2O  Set Max Pressure to 20.0 cmH2O  Set Response to Standard  Set EPR to Fulltime  Set EPR level to 2  Set Ramp enable to On  Set Ramp time to 30 min  Set Start pressure to 4.0 cmH2O

## 2024-07-10 NOTE — PROGRESS NOTES
[facility]  Telephone: [phone] / Fax: [phone] [place]  Telephone: [phone] / Fax: [phone] Massiel Amador    PCP: Suraj Waters MD    Reason for visit: LOUIE f/u    Cesilia Goel is a 78 y.o.female  was last seen at [facility] sleep lab in November 2023.  We ordered her a new machine. She received Air Sense 10 Auto Set device. Pressures are set at 8-20cm H2O and appear too strong at times. Residual AHI on treatment is 3/hr. AHI pre treatment was 83/hr.    She uses Air Fit N20 mask. She needs smaller size headgear. Current headhear is medium size.  reports excessive leaks and difficulty securing the straps. She previously used Mirage FX mask that worked better.    SH- smokes cigarettes, no caffeine    ROS- +cough, rest is negative.    DME [employer]    Current Medications:    Current Outpatient Medications:     ascorbic acid (VITAMIN C) 500 MG capsule controlled-release CR capsule, Take  by mouth., Disp: , Rfl:     aspirin 81 MG EC tablet, Take 1 tablet by mouth Daily., Disp: , Rfl:     atorvastatin (LIPITOR) 20 MG tablet, TAKE 1 TABLET BY MOUTH DAILY, Disp: 90 tablet, Rfl: 3    Calcium Carbonate-Vitamin D 500-125 MG-UNIT tablet, Take  by mouth., Disp: , Rfl:     cholecalciferol (VITAMIN D3) 25 MCG (1000 UT) tablet, Take 2 tablets by mouth Daily., Disp: , Rfl:     cyanocobalamin (VITAMIN B-12) 1000 MCG tablet, Take 1 tablet by mouth Daily., Disp: , Rfl:     diclofenac (VOLTAREN) 50 MG EC tablet, Take 1 tablet by mouth 2 (Two) Times a Day As Needed (Moderate to severe pain)., Disp: 60 tablet, Rfl: 0    escitalopram (LEXAPRO) 10 MG tablet, TAKE 1 TABLET BY MOUTH DAILY, Disp: 90 tablet, Rfl: 3    glipizide (GLUCOTROL) 5 MG tablet, TAKE 1 TABLET BY MOUTH TWICE DAILY BEFORE MEALS, Disp: 180 tablet, Rfl: 3    losartan (COZAAR) 100 MG tablet, TAKE 1 TABLET BY MOUTH DAILY, Disp: 90 tablet, Rfl: 3    metFORMIN (GLUCOPHAGE) 1000 MG tablet, TAKE 1 TABLET BY MOUTH TWICE DAILY AFTER  "A MEAL, Disp: 60 tablet, Rfl: 11    Multiple Vitamin (MULTIVITAMIN) tablet, Take 1 tablet by mouth., Disp: , Rfl:     MYRBETRIQ 50 MG tablet sustained-release 24 hour 24 hr tablet, Take  by mouth Daily., Disp: , Rfl: 3    pioglitazone (ACTOS) 15 MG tablet, TAKE 1 TABLET BY MOUTH DAILY, Disp: 90 tablet, Rfl: 1   also entered in Sleep Questionnaire    Patient  has a past medical history of Diabetes mellitus, Hyperlipidemia, and Hypertension.    I have reviewed the Past Medical History, Past Surgical History, Social History and Family History.    Vital Signs /74   Pulse 90   Ht 162.6 cm (64\")   Wt 74.8 kg (165 lb)   SpO2 94%   BMI 28.32 kg/m²  Body mass index is 28.32 kg/m².    General Alert and oriented. No acute distress noted   Pharynx/Throat Class IV Mallampati airway, large tongue, no evidence of redundant lateral pharyngeal tissue. No oral lesions. No thrush. Moist mucous membranes.   Head Normocephalic. Symmetrical. Atraumatic.    Nose No septal deviation. No drainage   Chest Wall Normal shape. Symmetric expansion with respiration. No tenderness.   Neck Trachea midline, no thyromegaly or adenopathy    Lungs Clear to auscultation bilaterally. No wheezes. No rhonchi. No rales. Respirations regular, even and unlabored.   Heart Regular rhythm and normal rate. Normal S1 and S2. No murmur   Abdomen Soft, non-tender and non-distended. Normal bowel sounds. No masses.   Extremities Moves all extremities well. No edema   Psychiatric Normal mood and affect.     Testing:  Download 4/10/24-7/9/24 100% use with avg nightly use of 9 hours and 40minutes on auto CPAP 8-20cm H2O, avg pressure 15.9    Study-Diagnostic data available to date is as below and was reviewed on current visit:  Study- severe obstructive sleep apnea syndrome and her polysomnography in the past has revealed apnea-hypopnea index of AHI 83.6 per sleep hour, minimum SpO2 of 79%     Impression:  1. Obstructive sleep apnea    2. Overweight (BMI " 25.0-29.9)    3. Multiple sclerosis, primary progressive    4. Difficulty with CPAP nasal mask use          Plan:  Order Mirage FX For Her size medium ask style.    Lower pressure to 8-15cm H2O to help correct leaks(done in the lab).    Pt was advised to replace CPAP accessories in regular intervals.    Machine helps a lot in general to control EDS, snoring and apneas.    Her MS is stable.    Thank you for allowing me to participate in your patient's care.      LAVERNE Perez  [facility]  Phone: [phone]      Part of this note may be an electronic transcription/translation of spoken language to printed text using the Dragon Dictation System.

## 2024-09-11 RX ORDER — GLIPIZIDE 5 MG/1
TABLET ORAL
Qty: 180 TABLET | Refills: 3 | Status: SHIPPED | OUTPATIENT
Start: 2024-09-11

## 2024-10-19 DIAGNOSIS — E11.40 TYPE 2 DIABETES MELLITUS WITH DIABETIC NEUROPATHY, WITHOUT LONG-TERM CURRENT USE OF INSULIN: ICD-10-CM

## 2024-10-29 ENCOUNTER — OFFICE VISIT (OUTPATIENT)
Dept: SPORTS MEDICINE | Facility: CLINIC | Age: 79
End: 2024-10-29
Payer: MEDICARE

## 2024-10-29 ENCOUNTER — LAB (OUTPATIENT)
Dept: LAB | Facility: HOSPITAL | Age: 79
End: 2024-10-29
Payer: MEDICARE

## 2024-10-29 VITALS
SYSTOLIC BLOOD PRESSURE: 130 MMHG | BODY MASS INDEX: 28.17 KG/M2 | WEIGHT: 165 LBS | DIASTOLIC BLOOD PRESSURE: 68 MMHG | HEART RATE: 84 BPM | TEMPERATURE: 96 F | HEIGHT: 64 IN | OXYGEN SATURATION: 94 %

## 2024-10-29 DIAGNOSIS — G35 MULTIPLE SCLEROSIS, PRIMARY PROGRESSIVE: ICD-10-CM

## 2024-10-29 DIAGNOSIS — Z00.00 MEDICARE ANNUAL WELLNESS VISIT, SUBSEQUENT: Primary | ICD-10-CM

## 2024-10-29 DIAGNOSIS — I10 PRIMARY HYPERTENSION: ICD-10-CM

## 2024-10-29 DIAGNOSIS — Z78.0 POSTMENOPAUSAL: ICD-10-CM

## 2024-10-29 DIAGNOSIS — E53.8 B12 DEFICIENCY: ICD-10-CM

## 2024-10-29 DIAGNOSIS — E78.2 MIXED HYPERLIPIDEMIA: ICD-10-CM

## 2024-10-29 DIAGNOSIS — E11.40 TYPE 2 DIABETES MELLITUS WITH DIABETIC NEUROPATHY, WITHOUT LONG-TERM CURRENT USE OF INSULIN: ICD-10-CM

## 2024-10-29 PROBLEM — R41.89 COGNITIVE IMPAIRMENT: Status: ACTIVE | Noted: 2022-11-28

## 2024-10-29 LAB
ALBUMIN SERPL-MCNC: 4.2 G/DL (ref 3.5–5.2)
ALBUMIN UR-MCNC: <1.2 MG/DL
ALBUMIN/GLOB SERPL: 1.3 G/DL
ALP SERPL-CCNC: 91 U/L (ref 39–117)
ALT SERPL W P-5'-P-CCNC: 11 U/L (ref 1–33)
ANION GAP SERPL CALCULATED.3IONS-SCNC: 10.4 MMOL/L (ref 5–15)
AST SERPL-CCNC: 13 U/L (ref 1–32)
BACTERIA UR QL AUTO: ABNORMAL /HPF
BASOPHILS # BLD AUTO: 0.06 10*3/MM3 (ref 0–0.2)
BASOPHILS NFR BLD AUTO: 0.5 % (ref 0–1.5)
BILIRUB SERPL-MCNC: 0.2 MG/DL (ref 0–1.2)
BILIRUB UR QL STRIP: NEGATIVE
BUN SERPL-MCNC: 20 MG/DL (ref 8–23)
BUN/CREAT SERPL: 36.4 (ref 7–25)
CALCIUM SPEC-SCNC: 9.7 MG/DL (ref 8.6–10.5)
CHLORIDE SERPL-SCNC: 103 MMOL/L (ref 98–107)
CHOLEST SERPL-MCNC: 175 MG/DL (ref 0–200)
CLARITY UR: ABNORMAL
CO2 SERPL-SCNC: 27.6 MMOL/L (ref 22–29)
COLOR UR: YELLOW
CREAT SERPL-MCNC: 0.55 MG/DL (ref 0.57–1)
CREAT UR-MCNC: 72.8 MG/DL
DEPRECATED RDW RBC AUTO: 48.7 FL (ref 37–54)
EGFRCR SERPLBLD CKD-EPI 2021: 94 ML/MIN/1.73
EOSINOPHIL # BLD AUTO: 0.3 10*3/MM3 (ref 0–0.4)
EOSINOPHIL NFR BLD AUTO: 2.4 % (ref 0.3–6.2)
ERYTHROCYTE [DISTWIDTH] IN BLOOD BY AUTOMATED COUNT: 14.9 % (ref 12.3–15.4)
GLOBULIN UR ELPH-MCNC: 3.2 GM/DL
GLUCOSE SERPL-MCNC: 128 MG/DL (ref 65–99)
GLUCOSE UR STRIP-MCNC: NEGATIVE MG/DL
HBA1C MFR BLD: 7 % (ref 4.8–5.6)
HCT VFR BLD AUTO: 39.1 % (ref 34–46.6)
HDLC SERPL QL: 3.57
HDLC SERPL-MCNC: 49 MG/DL (ref 40–60)
HGB BLD-MCNC: 12.1 G/DL (ref 12–15.9)
HGB UR QL STRIP.AUTO: NEGATIVE
HOLD SPECIMEN: NORMAL
HYALINE CASTS UR QL AUTO: ABNORMAL /LPF
IMM GRANULOCYTES # BLD AUTO: 0.06 10*3/MM3 (ref 0–0.05)
IMM GRANULOCYTES NFR BLD AUTO: 0.5 % (ref 0–0.5)
KETONES UR QL STRIP: NEGATIVE
LDLC SERPL CALC-MCNC: 80 MG/DL (ref 0–100)
LEUKOCYTE ESTERASE UR QL STRIP.AUTO: ABNORMAL
LYMPHOCYTES # BLD AUTO: 4.13 10*3/MM3 (ref 0.7–3.1)
LYMPHOCYTES NFR BLD AUTO: 33.2 % (ref 19.6–45.3)
MCH RBC QN AUTO: 27.8 PG (ref 26.6–33)
MCHC RBC AUTO-ENTMCNC: 30.9 G/DL (ref 31.5–35.7)
MCV RBC AUTO: 89.9 FL (ref 79–97)
MICROALBUMIN/CREAT UR: NORMAL MG/G{CREAT}
MONOCYTES # BLD AUTO: 0.84 10*3/MM3 (ref 0.1–0.9)
MONOCYTES NFR BLD AUTO: 6.8 % (ref 5–12)
NEUTROPHILS NFR BLD AUTO: 56.6 % (ref 42.7–76)
NEUTROPHILS NFR BLD AUTO: 7.05 10*3/MM3 (ref 1.7–7)
NITRITE UR QL STRIP: NEGATIVE
NRBC BLD AUTO-RTO: 0 /100 WBC (ref 0–0.2)
PH UR STRIP.AUTO: 6 [PH] (ref 5–8)
PLATELET # BLD AUTO: 265 10*3/MM3 (ref 140–450)
PMV BLD AUTO: 10.9 FL (ref 6–12)
POTASSIUM SERPL-SCNC: 4.5 MMOL/L (ref 3.5–5.2)
PROT SERPL-MCNC: 7.4 G/DL (ref 6–8.5)
PROT UR QL STRIP: NEGATIVE
RBC # BLD AUTO: 4.35 10*6/MM3 (ref 3.77–5.28)
RBC # UR STRIP: ABNORMAL /HPF
REF LAB TEST METHOD: ABNORMAL
SODIUM SERPL-SCNC: 141 MMOL/L (ref 136–145)
SP GR UR STRIP: 1.02 (ref 1–1.03)
SQUAMOUS #/AREA URNS HPF: ABNORMAL /HPF
TRIGL SERPL-MCNC: 283 MG/DL (ref 0–150)
UROBILINOGEN UR QL STRIP: ABNORMAL
VIT B12 BLD-MCNC: 754 PG/ML (ref 211–946)
VLDLC SERPL-MCNC: 46 MG/DL (ref 5–40)
WBC # UR STRIP: ABNORMAL /HPF
WBC NRBC COR # BLD AUTO: 12.44 10*3/MM3 (ref 3.4–10.8)

## 2024-10-29 PROCEDURE — 85025 COMPLETE CBC W/AUTO DIFF WBC: CPT | Performed by: FAMILY MEDICINE

## 2024-10-29 PROCEDURE — 82570 ASSAY OF URINE CREATININE: CPT | Performed by: FAMILY MEDICINE

## 2024-10-29 PROCEDURE — G0439 PPPS, SUBSEQ VISIT: HCPCS | Performed by: FAMILY MEDICINE

## 2024-10-29 PROCEDURE — 83036 HEMOGLOBIN GLYCOSYLATED A1C: CPT | Performed by: FAMILY MEDICINE

## 2024-10-29 PROCEDURE — 80053 COMPREHEN METABOLIC PANEL: CPT | Performed by: FAMILY MEDICINE

## 2024-10-29 PROCEDURE — 3078F DIAST BP <80 MM HG: CPT | Performed by: FAMILY MEDICINE

## 2024-10-29 PROCEDURE — 81001 URINALYSIS AUTO W/SCOPE: CPT | Performed by: FAMILY MEDICINE

## 2024-10-29 PROCEDURE — 36415 COLL VENOUS BLD VENIPUNCTURE: CPT | Performed by: FAMILY MEDICINE

## 2024-10-29 PROCEDURE — 87086 URINE CULTURE/COLONY COUNT: CPT | Performed by: FAMILY MEDICINE

## 2024-10-29 PROCEDURE — 3075F SYST BP GE 130 - 139MM HG: CPT | Performed by: FAMILY MEDICINE

## 2024-10-29 PROCEDURE — 82043 UR ALBUMIN QUANTITATIVE: CPT | Performed by: FAMILY MEDICINE

## 2024-10-29 PROCEDURE — 99214 OFFICE O/P EST MOD 30 MIN: CPT | Performed by: FAMILY MEDICINE

## 2024-10-29 PROCEDURE — 82607 VITAMIN B-12: CPT | Performed by: FAMILY MEDICINE

## 2024-10-29 PROCEDURE — 84443 ASSAY THYROID STIM HORMONE: CPT | Performed by: FAMILY MEDICINE

## 2024-10-29 PROCEDURE — 80061 LIPID PANEL: CPT | Performed by: FAMILY MEDICINE

## 2024-10-29 NOTE — PROGRESS NOTES
Subjective   The ABCs of the Annual Wellness Visit  Medicare Wellness Visit      Cesilia Goel is a 79 y.o. patient who presents for a Medicare Wellness Visit.    The following portions of the patient's history were reviewed and   updated as appropriate: allergies, current medications, past family history, past medical history, past social history, past surgical history, and problem list.    Compared to one year ago, the patient's physical   health is the same.  Compared to one year ago, the patient's mental   health is worse.    Recent Hospitalizations:  She was not admitted to the hospital during the last year.     Current Medical Providers:  Patient Care Team:  Suraj Waters MD as PCP - General (Sports Medicine)    Outpatient Medications Prior to Visit   Medication Sig Dispense Refill    ascorbic acid (VITAMIN C) 500 MG capsule controlled-release CR capsule Take  by mouth.      aspirin 81 MG EC tablet Take 1 tablet by mouth Daily.      atorvastatin (LIPITOR) 20 MG tablet TAKE 1 TABLET BY MOUTH DAILY 90 tablet 3    Calcium Carbonate-Vitamin D 500-125 MG-UNIT tablet Take  by mouth.      cholecalciferol (VITAMIN D3) 25 MCG (1000 UT) tablet Take 2 tablets by mouth Daily.      diclofenac (VOLTAREN) 50 MG EC tablet Take 1 tablet by mouth 2 (Two) Times a Day As Needed (Moderate to severe pain). 60 tablet 0    escitalopram (LEXAPRO) 10 MG tablet TAKE 1 TABLET BY MOUTH DAILY 90 tablet 3    glipizide (GLUCOTROL) 5 MG tablet TAKE 1 TABLET BY MOUTH TWICE DAILY BEFORE MEALS 180 tablet 3    losartan (COZAAR) 100 MG tablet TAKE 1 TABLET BY MOUTH DAILY 90 tablet 3    metFORMIN (GLUCOPHAGE) 1000 MG tablet TAKE 1 TABLET BY MOUTH TWICE DAILY AFTER A MEAL 60 tablet 0    Multiple Vitamin (MULTIVITAMIN) tablet Take 1 tablet by mouth.      MYRBETRIQ 50 MG tablet sustained-release 24 hour 24 hr tablet Take  by mouth Daily.  3    pioglitazone (ACTOS) 15 MG tablet TAKE 1 TABLET BY MOUTH DAILY 90 tablet 1    cyanocobalamin  "(VITAMIN B-12) 1000 MCG tablet Take 1 tablet by mouth Daily. (Patient not taking: Reported on 10/29/2024)       No facility-administered medications prior to visit.     No opioid medication identified on active medication list. I have reviewed chart for other potential  high risk medication/s and harmful drug interactions in the elderly.      Aspirin is on active medication list. Aspirin use is indicated based on review of current medical condition/s. Pros and cons of this therapy have been discussed today. Benefits of this medication outweigh potential harm.  Patient has been encouraged to continue taking this medication.  .      Patient Active Problem List   Diagnosis    Type 2 diabetes mellitus with diabetic neuropathy, without long-term current use of insulin    Allergic rhinitis    Mild single current episode of major depressive disorder    Dermatitis, eczematoid    HLD (hyperlipidemia)    Hypertension    Multiple sclerosis, primary progressive    Absence of bladder continence    LOUIE (obstructive sleep apnea)    Hypersomnia    Current smoker    Cognitive impairment     Advance Care Planning Advance Directive is on file.  ACP discussion was held with the patient during this visit. Patient has an advance directive in EMR which is still valid.             Objective   Vitals:    10/29/24 1010   BP: 130/68   BP Location: Right arm   Patient Position: Sitting   Cuff Size: Adult   Pulse: 84   Temp: 96 °F (35.6 °C)   TempSrc: Temporal   SpO2: 94%   Weight: 74.8 kg (165 lb)   Height: 162.6 cm (64\")   PainSc: 0-No pain   PainLoc: Generalized       Estimated body mass index is 28.32 kg/m² as calculated from the following:    Height as of this encounter: 162.6 cm (64\").    Weight as of this encounter: 74.8 kg (165 lb).    BMI is >= 25 and <30. (Overweight) The following options were offered after discussion;: nutrition counseling/recommendations       Does the patient have evidence of cognitive impairment? Yes  Lab Results "   Component Value Date    TRIG 283 (H) 10/29/2024    HDL 49 10/29/2024    LDL 80 10/29/2024    VLDL 46 (H) 10/29/2024    HGBA1C 7.00 (H) 10/29/2024                                                                                                Health  Risk Assessment    Smoking Status:  Social History     Tobacco Use   Smoking Status Every Day   Smokeless Tobacco Never     Alcohol Consumption:  Social History     Substance and Sexual Activity   Alcohol Use No       Fall Risk Screen  STEADI Fall Risk Assessment was completed, and patient is at MODERATE risk for falls. Assessment completed on:10/29/2024    Depression Screening:      10/29/2024    10:13 AM   PHQ-2/PHQ-9 Depression Screening   Little interest or pleasure in doing things Not at all   Feeling down, depressed, or hopeless Not at all     Health Habits and Functional and Cognitive Screening:      10/29/2024    10:13 AM   Functional & Cognitive Status   Do you have difficulty preparing food and eating? Yes   Do you have difficulty bathing yourself, getting dressed or grooming yourself? No   Do you have difficulty using the toilet? No   Do you have difficulty moving around from place to place? No   Do you have trouble with steps or getting out of a bed or a chair? No   Current Diet Well Balanced Diet   Dental Exam Up to date   Eye Exam Up to date   Exercise (times per week) 4 times per week   Current Exercises Include --        Exercise Comment recumbant bike   Do you need help using the phone?  No   Are you deaf or do you have serious difficulty hearing?  No   Do you need help to go to places out of walking distance? Yes   Do you need help shopping? Yes   Do you need help preparing meals?  No   Do you need help with housework?  Yes   Do you need help with laundry? No   Do you need help taking your medications? Yes   Do you need help managing money? Yes   Do you ever drive or ride in a car without wearing a seat belt? No   Have you felt unusual stress, anger or  loneliness in the last month? No   Who do you live with? Spouse   If you need help, do you have trouble finding someone available to you? No   Have you been bothered in the last four weeks by sexual problems? No   Do you have difficulty concentrating, remembering or making decisions? Yes           Age-appropriate Screening Schedule:  Refer to the list below for future screening recommendations based on patient's age, sex and/or medical conditions. Orders for these recommended tests are listed in the plan section. The patient has been provided with a written plan.    Health Maintenance List  Health Maintenance   Topic Date Due    DXA SCAN  Never done    ZOSTER VACCINE (2 of 2) 09/14/2017    MAMMOGRAM  07/24/2020    RSV Vaccine - Adults (1 - 1-dose 75+ series) Never done    ANNUAL WELLNESS VISIT  05/19/2024    BMI FOLLOWUP  05/19/2024    INFLUENZA VACCINE  Never done    COVID-19 Vaccine (1 - 2023-24 season) Never done    HEMOGLOBIN A1C  04/29/2025    DIABETIC EYE EXAM  10/16/2025    LIPID PANEL  10/29/2025    URINE MICROALBUMIN  10/29/2025    TDAP/TD VACCINES (2 - Td or Tdap) 07/20/2027    COLORECTAL CANCER SCREENING  07/20/2027    HEPATITIS C SCREENING  Completed    Pneumococcal Vaccine 65+  Completed                                                                                                                                                CMS Preventative Services Quick Reference  Risk Factors Identified During Encounter  Depression/Dysphoria: Current medication is effective, no change recommended  Fall Risk-High or Moderate: Discussed Fall Prevention in the home  Immunizations Discussed/Encouraged: Influenza and Prevnar 20 (Pneumococcal 20-valent conjugate)    The above risks/problems have been discussed with the patient.  Pertinent information has been shared with the patient in the After Visit Summary.  An After Visit Summary and PPPS were made available to the patient.    Follow Up:   Next Medicare Wellness  "visit to be scheduled in 1 year.         Additional E&M Note during same encounter follows:  Patient has additional, significant, and separately identifiable condition(s)/problem(s) that require work above and beyond the Medicare Wellness Visit     Chief Complaint  Medicare Wellness-subsequent (AWV/)    Subjective   HPI  Pat is also being seen today for additional medical problem/s.          Health is overall stable.  Follow-up diabetes.  Admits to inconsistent diet control.  Still likes sweets and starches.  Unable to exercise due to her MS.    Regarding her MS, she has some progressive cognitive difficulties monitored by neurology  Chronic depression currently stable  Hypertension stable  Hyperlipidemia stable        Objective   Vital Signs:  /68 (BP Location: Right arm, Patient Position: Sitting, Cuff Size: Adult)   Pulse 84   Temp 96 °F (35.6 °C) (Temporal)   Ht 162.6 cm (64\")   Wt 74.8 kg (165 lb)   SpO2 94%   BMI 28.32 kg/m²   Physical Exam  Vitals reviewed.   Constitutional:       Appearance: She is well-developed.      Comments: Stable appearance in a power chair   Eyes:      Extraocular Movements: Extraocular movements intact.      Pupils: Pupils are equal, round, and reactive to light.   Cardiovascular:      Rate and Rhythm: Normal rate and regular rhythm.      Heart sounds: Normal heart sounds.   Pulmonary:      Effort: Pulmonary effort is normal.      Breath sounds: Normal breath sounds.   Musculoskeletal:      Cervical back: Normal range of motion.   Skin:     General: Skin is warm and dry.   Neurological:      General: No focal deficit present.      Mental Status: She is alert and oriented to person, place, and time.   Psychiatric:         Mood and Affect: Mood normal.      Comments: Patient seems somewhat slow in answering questions requiring memory recall.  Based on neurologic examination elsewhere, full cognitive testing declined                 Assessment and Plan               Medicare " annual wellness visit, subsequent    Type 2 diabetes mellitus with diabetic neuropathy, without long-term current use of insulin    Mixed hyperlipidemia     Primary hypertension    Multiple sclerosis, primary progressive    B12 deficiency    Postmenopausal        Labs ordered as documented.  Recommend bone density screening.  Discussed fall risk.  Encouraged healthy diet and considerations for diabetes management.  Unable to add much exercise due to her MS.    Orders Placed This Encounter   Procedures    Urine Culture - Urine, Urine, Clean Catch     Order Specific Question:   Release to patient     Answer:   Routine Release [[id number]]    DEXA Bone Density Axial     Standing Status:   Future     Standing Expiration Date:   10/29/2025     Order Specific Question:   Reason for Exam:     Answer:   screening     Order Specific Question:   Release to patient     Answer:   Routine Release [[id number]]     Order Specific Question:   Does this patient have a diabetic monitoring/medication delivering device on?     Answer:   No     Order Specific Question:   Is patient taking or have taken long term Glucocorticoid (steroids)?     Answer:   No     Order Specific Question:   Does the patient have rheumatoid arthritis?     Answer:   No     Order Specific Question:   Does the patient have secondary osteoporosis?     Answer:   No    Comprehensive Metabolic Panel     Order Specific Question:   Release to patient     Answer:   Routine Release [[id number]]    Lipid Panel With / Chol / HDL Ratio     Order Specific Question:   Release to patient     Answer:   Routine Release [[id number]]    Thyroid Cascade Profile     Order Specific Question:   Release to patient     Answer:   Routine Release [[id number]]    Urinalysis With Culture If Indicated - Urine, Clean Catch     Order Specific Question:   Release to patient     Answer:   Routine Release [[id number]]    Hemoglobin A1c     Order Specific Question:   Release to patient      Answer:   Routine Release [[id number]]    Vitamin B12     Order Specific Question:   Release to patient     Answer:   Routine Release [[id number]]    Microalbumin / Creatinine Urine Ratio - Urine, Clean Catch     Order Specific Question:   Release to patient     Answer:   Routine Release [[id number]]    CBC Auto Differential     Order Specific Question:   Release to patient     Answer:   Routine Release [[id number]]    [place] Urine Culture Tube - Urine, Clean Catch     Order Specific Question:   Release to patient     Answer:   Routine Release [[id number]]    Urinalysis, Microscopic Only - Urine, Clean Catch     Order Specific Question:   Release to patient     Answer:   Routine Release [[id number]]    CBC & Differential     Order Specific Question:   Manual Differential     Answer:   No     Order Specific Question:   Release to patient     Answer:   Routine Release [[id number]]             Follow Up   No follow-ups on file.  Patient was given instructions and counseling regarding her condition or for health maintenance advice. Please see specific information pulled into the AVS if appropriate.

## 2024-10-30 LAB
BACTERIA SPEC AEROBE CULT: NO GROWTH
TSH SERPL DL<=0.005 MIU/L-ACNC: 2.39 UIU/ML (ref 0.45–4.5)

## 2024-10-31 ENCOUNTER — TELEPHONE (OUTPATIENT)
Dept: SPORTS MEDICINE | Facility: CLINIC | Age: 79
End: 2024-10-31
Payer: MEDICARE

## 2024-10-31 DIAGNOSIS — D72.829 LEUKOCYTOSIS, UNSPECIFIED TYPE: Primary | ICD-10-CM

## 2024-10-31 NOTE — TELEPHONE ENCOUNTER
HUB AGENT ATTEMPTED NON-CLINICAL WARM TRANSFER - NO ANSWER     PLEASE CALL / LEAVE VMAIL  MARSHA CELL [phone] TO ADVISE IF PATIENT CAN HAVE BLOOD WORK ORDERED & TO BE DONE AT / AROUND THE SAME TIME AS  MARSHA's APPT w/PCP DR BRANTLEY 11/27 @ 2534?     THANKS

## 2024-11-01 NOTE — PROGRESS NOTES
Patient notified of results via [employer].I called and followed up with patient's  to make sure they reviewed results, he confirmed they reviewed results and they didn't have any further questions or requests at this time. No further action necessary at this time.     ETIENNE Dick

## 2024-12-11 ENCOUNTER — HOSPITAL ENCOUNTER (OUTPATIENT)
Facility: HOSPITAL | Age: 79
Discharge: HOME | End: 2024-12-11
Admitting: FAMILY MEDICINE
Payer: MEDICARE

## 2024-12-11 DIAGNOSIS — Z00.00 MEDICARE ANNUAL WELLNESS VISIT, SUBSEQUENT: ICD-10-CM

## 2024-12-11 DIAGNOSIS — Z78.0 POSTMENOPAUSAL: ICD-10-CM

## 2024-12-11 PROCEDURE — 77080 DXA BONE DENSITY AXIAL: CPT

## 2025-01-08 DIAGNOSIS — E11.40 TYPE 2 DIABETES MELLITUS WITH DIABETIC NEUROPATHY, WITHOUT LONG-TERM CURRENT USE OF INSULIN: ICD-10-CM

## 2025-01-20 ENCOUNTER — OFFICE VISIT (OUTPATIENT)
Dept: SLEEP MEDICINE | Facility: HOSPITAL | Age: 80
End: 2025-01-20
Payer: MEDICARE

## 2025-01-20 VITALS
OXYGEN SATURATION: 94 % | HEART RATE: 88 BPM | SYSTOLIC BLOOD PRESSURE: 143 MMHG | WEIGHT: 160 LBS | BODY MASS INDEX: 27.31 KG/M2 | HEIGHT: 64 IN | DIASTOLIC BLOOD PRESSURE: 72 MMHG

## 2025-01-20 DIAGNOSIS — E66.3 OVERWEIGHT (BMI 25.0-29.9): ICD-10-CM

## 2025-01-20 DIAGNOSIS — G47.33 OBSTRUCTIVE SLEEP APNEA, ADULT: Primary | ICD-10-CM

## 2025-01-20 DIAGNOSIS — G35 MULTIPLE SCLEROSIS, PRIMARY PROGRESSIVE: ICD-10-CM

## 2025-01-20 PROCEDURE — G0463 HOSPITAL OUTPT CLINIC VISIT: HCPCS

## 2025-01-20 NOTE — PROGRESS NOTES
"[facility]  [address]  [place] [address]  [phone]    PCP: Suraj Waters MD    Reason for visit:  Sleep disorders: LOUIE    Cesilia \"Pat\" is a 79 y.o.female who was seen in the [facility] today. Regular fu. She is doing well with her CPAP. Sleeps from 11pm to 9am. Wakes up rested and refreshed. She uses nasal mask. It mostly fits well (some air leaks). Replaces regularly.  [place] Sleepiness Scale is 3. Caffeine - per day. Alcohol - per week.    Cesilia \"Pat\"  reports that she has been smoking. She has never used smokeless tobacco.    Pertinent Positive Review of Systems of cough  Rest of Review of Systems was negative as recorded in Sleep Questionnaire.    Patient  has a past medical history of Diabetes mellitus, Hyperlipidemia, and Hypertension. MULTIPLE sclerosis    Current Medications:    Current Outpatient Medications:     ascorbic acid (VITAMIN C) 500 MG capsule controlled-release CR capsule, Take  by mouth., Disp: , Rfl:     aspirin 81 MG EC tablet, Take 1 tablet by mouth Daily., Disp: , Rfl:     atorvastatin (LIPITOR) 20 MG tablet, TAKE 1 TABLET BY MOUTH DAILY, Disp: 90 tablet, Rfl: 3    Calcium Carbonate-Vitamin D 500-125 MG-UNIT tablet, Take  by mouth., Disp: , Rfl:     cholecalciferol (VITAMIN D3) 25 MCG (1000 UT) tablet, Take 2 tablets by mouth Daily., Disp: , Rfl:     cyanocobalamin (VITAMIN B-12) 1000 MCG tablet, Take 1 tablet by mouth Daily. (Patient not taking: Reported on 10/29/2024), Disp: , Rfl:     diclofenac (VOLTAREN) 50 MG EC tablet, Take 1 tablet by mouth 2 (Two) Times a Day As Needed (Moderate to severe pain)., Disp: 60 tablet, Rfl: 0    escitalopram (LEXAPRO) 10 MG tablet, TAKE 1 TABLET BY MOUTH DAILY, Disp: 90 tablet, Rfl: 3    glipizide (GLUCOTROL) 5 MG tablet, TAKE 1 TABLET BY MOUTH TWICE DAILY BEFORE MEALS, Disp: 180 tablet, Rfl: 3    losartan (COZAAR) 100 MG tablet, TAKE 1 TABLET BY MOUTH DAILY, Disp: 90 tablet, Rfl: 3    " "metFORMIN (GLUCOPHAGE) 1000 MG tablet, TAKE 1 TABLET BY MOUTH TWICE DAILY AFTER A MEAL, Disp: 60 tablet, Rfl: 0    Multiple Vitamin (MULTIVITAMIN) tablet, Take 1 tablet by mouth., Disp: , Rfl:     MYRBETRIQ 50 MG tablet sustained-release 24 hour 24 hr tablet, Take  by mouth Daily., Disp: , Rfl: 3    pioglitazone (ACTOS) 15 MG tablet, TAKE 1 TABLET BY MOUTH DAILY, Disp: 90 tablet, Rfl: 1   also entered in Sleep Questionnaire         Vital Signs: /72   Pulse 88   Ht 162.6 cm (64.02\")   Wt 72.6 kg (160 lb)   SpO2 94%   BMI 27.45 kg/m²     Body mass index is 27.45 kg/m².       Tongue: Large       Dentition: good       Pharynx: Posterior pharyngeal pillars are narrow   Mallampatti: III (soft and hard palate and base of uvula visible)        General: Alert. Cooperative. Well developed. No acute distress.             Nose: No septal deviation. No drainage.          Throat: No oral lesions. No thrush. Moist mucous membranes.           Lungs:  Clear to auscultation bilaterally.            Heart:  Regular rhythm and normal rate. No murmur.     Diagnostic data available to date is as below and was reviewed on current visit:  Study- severe obstructive sleep apnea syndrome and her polysomnography in the past has revealed apnea-hypopnea index of AHI 83.6 per sleep hour, minimum SpO2 of 79%     No results found for: \"IRON\", \"TIBC\", \"FERRITIN\"    Most current available usage data reviewed on 01/20/2025:  No scans are attached to the encounter or orders placed in the encounter.  98% compliance average 9-1/2 hours AHI 5.9 average pressure 12.6 auto CPAP set 8-15    DME Company: [employer]    [place] [facility] [place]:    nasal mask      Description Replacement    Nasal PILLOWS      A 7034 Nasal Pillows  every 3 mth    A 7033 Repl Nasal Pillows  2 per mth    Nasal MASK/CUSHION     x A 7034 Nasal Mask/Cushion  every 3 mth   x A 7032 Repl Nasal Mask/Cushion  2 per mth    Full Face MASK      A 7030 Full " "Face Mask  every 3 mth    A 7031 Repl Face Mask  1 per mth      A 4604 Heated Tubing  every 3 mth    A 7037 Standard Tubing  every 3 mth   x A 7035 Headgear  every 3 mth   x A 7046 Repl Humidifier Chamber  every 6 yrs   x A 7038 Disposable Filters  2 per mth   x A 7039 Non-disposable Filter  every 6 mth   x A 7036 Chin Strap  every 6 mth     No orders of the defined types were placed in this encounter.         Impression:  1. Obstructive sleep apnea, adult    2. Overweight (BMI 25.0-29.9)    3. Multiple sclerosis, primary progressive        Plan:  Cesilia \"Pat\" is doing well, do not increase pr as already borderline leaks.  She finds the CPAP beneficial and wakes up more rested..  Her multiple sclerosis is stable.    I reiterated the importance of effective treatment of obstructive sleep apnea with PAP machine.  Cardiovascular health risks of untreated sleep apnea were again reviewed.  Patient was asked to remain cautious if there is persistent hypersomnolence. The benefit of weight loss in reducing severity of obstructive sleep apnea was discussed.  Patient would benefit from adhering to a strict diet to achieve ideal BMI.     Change of PAP supplies regularly is important for effective use.  Change of cushion on the mask or plugs on nasal pillows along with disposable filters once every month and change of mask frame, tubing, headgear and Velcro straps every 6 months at the minimum was reiterated.    This patient is compliant with PAP machine and benefits from its use.  Apnea hypopneas index is corrected/improved.  Daytime hypersomnolence has resolved.     Patient will follow up in this clinic in 1 year APRN    Thank you for allowing me to participate in your patient's care.    Electronically signed by Aiden Ely MD, 01/20/25, 11:09 AM EST.    Part of this note may be an electronic transcription/translation of spoken language to printed text using the Dragon Dictation System.  "

## 2025-02-07 DIAGNOSIS — E11.40 TYPE 2 DIABETES MELLITUS WITH DIABETIC NEUROPATHY, WITHOUT LONG-TERM CURRENT USE OF INSULIN: ICD-10-CM

## 2025-03-05 DIAGNOSIS — E11.40 TYPE 2 DIABETES MELLITUS WITH DIABETIC NEUROPATHY, WITHOUT LONG-TERM CURRENT USE OF INSULIN: ICD-10-CM

## 2025-03-05 RX ORDER — PIOGLITAZONE 15 MG/1
15 TABLET ORAL DAILY
Qty: 90 TABLET | Refills: 1 | Status: SHIPPED | OUTPATIENT
Start: 2025-03-05

## 2025-03-07 RX ORDER — ESCITALOPRAM OXALATE 10 MG/1
TABLET ORAL
Qty: 90 TABLET | Refills: 3 | Status: SHIPPED | OUTPATIENT
Start: 2025-03-07

## 2025-03-12 DIAGNOSIS — E11.40 TYPE 2 DIABETES MELLITUS WITH DIABETIC NEUROPATHY, WITHOUT LONG-TERM CURRENT USE OF INSULIN: ICD-10-CM

## 2025-05-29 ENCOUNTER — TELEPHONE (OUTPATIENT)
Dept: SPORTS MEDICINE | Facility: CLINIC | Age: 80
End: 2025-05-29
Payer: MEDICARE

## 2025-05-29 DIAGNOSIS — E11.40 TYPE 2 DIABETES MELLITUS WITH DIABETIC NEUROPATHY, WITHOUT LONG-TERM CURRENT USE OF INSULIN: Primary | ICD-10-CM

## 2025-06-02 ENCOUNTER — LAB (OUTPATIENT)
Dept: LAB | Facility: HOSPITAL | Age: 80
End: 2025-06-02
Payer: MEDICARE

## 2025-06-02 LAB — HBA1C MFR BLD: 6.8 % (ref 4.8–5.6)

## 2025-06-02 PROCEDURE — 83036 HEMOGLOBIN GLYCOSYLATED A1C: CPT | Performed by: FAMILY MEDICINE

## 2025-06-04 RX ORDER — LOSARTAN POTASSIUM 100 MG/1
100 TABLET ORAL DAILY
Qty: 90 TABLET | Refills: 3 | Status: SHIPPED | OUTPATIENT
Start: 2025-06-04

## 2025-06-04 RX ORDER — ATORVASTATIN CALCIUM 20 MG/1
20 TABLET, FILM COATED ORAL DAILY
Qty: 90 TABLET | Refills: 3 | Status: SHIPPED | OUTPATIENT
Start: 2025-06-04

## 2025-06-05 ENCOUNTER — RESULTS FOLLOW-UP (OUTPATIENT)
Dept: SPORTS MEDICINE | Facility: CLINIC | Age: 80
End: 2025-06-05
Payer: MEDICARE

## 2025-06-15 DIAGNOSIS — E11.40 TYPE 2 DIABETES MELLITUS WITH DIABETIC NEUROPATHY, WITHOUT LONG-TERM CURRENT USE OF INSULIN: ICD-10-CM
